# Patient Record
Sex: FEMALE | Race: WHITE | NOT HISPANIC OR LATINO
[De-identification: names, ages, dates, MRNs, and addresses within clinical notes are randomized per-mention and may not be internally consistent; named-entity substitution may affect disease eponyms.]

---

## 2017-03-02 ENCOUNTER — APPOINTMENT (OUTPATIENT)
Dept: OBGYN | Facility: CLINIC | Age: 67
End: 2017-03-02

## 2017-03-02 VITALS
DIASTOLIC BLOOD PRESSURE: 60 MMHG | SYSTOLIC BLOOD PRESSURE: 90 MMHG | BODY MASS INDEX: 29.37 KG/M2 | HEIGHT: 64 IN | WEIGHT: 172 LBS

## 2017-03-02 DIAGNOSIS — Z92.89 PERSONAL HISTORY OF OTHER MEDICAL TREATMENT: ICD-10-CM

## 2017-03-02 DIAGNOSIS — Z86.59 PERSONAL HISTORY OF OTHER MENTAL AND BEHAVIORAL DISORDERS: ICD-10-CM

## 2017-03-02 DIAGNOSIS — Z78.9 OTHER SPECIFIED HEALTH STATUS: ICD-10-CM

## 2017-03-02 DIAGNOSIS — Z86.19 PERSONAL HISTORY OF OTHER INFECTIOUS AND PARASITIC DISEASES: ICD-10-CM

## 2017-03-02 DIAGNOSIS — Z80.42 FAMILY HISTORY OF MALIGNANT NEOPLASM OF PROSTATE: ICD-10-CM

## 2017-03-06 LAB — HPV HIGH+LOW RISK DNA PNL CVX: NEGATIVE

## 2017-03-07 LAB — CYTOLOGY CVX/VAG DOC THIN PREP: NORMAL

## 2017-05-22 ENCOUNTER — OTHER (OUTPATIENT)
Age: 67
End: 2017-05-22

## 2018-03-02 ENCOUNTER — APPOINTMENT (OUTPATIENT)
Dept: OBGYN | Facility: CLINIC | Age: 68
End: 2018-03-02
Payer: MEDICARE

## 2018-03-02 VITALS
BODY MASS INDEX: 34.15 KG/M2 | HEIGHT: 64 IN | SYSTOLIC BLOOD PRESSURE: 125 MMHG | WEIGHT: 200 LBS | DIASTOLIC BLOOD PRESSURE: 70 MMHG

## 2018-03-02 PROCEDURE — 99214 OFFICE O/P EST MOD 30 MIN: CPT

## 2018-03-02 RX ORDER — CLINDAMYCIN HYDROCHLORIDE 150 MG/1
150 CAPSULE ORAL
Qty: 20 | Refills: 0 | Status: COMPLETED | COMMUNITY
Start: 2018-01-12

## 2018-03-02 RX ORDER — MIRTAZAPINE 30 MG/1
30 TABLET, FILM COATED ORAL
Qty: 90 | Refills: 0 | Status: ACTIVE | COMMUNITY
Start: 2018-02-15

## 2018-03-02 RX ORDER — AZITHROMYCIN 250 MG/1
250 TABLET, FILM COATED ORAL
Qty: 6 | Refills: 0 | Status: COMPLETED | COMMUNITY
Start: 2018-01-01

## 2018-03-02 RX ORDER — ENALAPRIL MALEATE 5 MG/1
5 TABLET ORAL
Qty: 90 | Refills: 0 | Status: ACTIVE | COMMUNITY
Start: 2018-02-16

## 2018-03-05 ENCOUNTER — MESSAGE (OUTPATIENT)
Age: 68
End: 2018-03-05

## 2018-04-10 ENCOUNTER — INPATIENT (INPATIENT)
Facility: HOSPITAL | Age: 68
LOS: 2 days | Discharge: HOME | End: 2018-04-13
Attending: FAMILY MEDICINE | Admitting: FAMILY MEDICINE
Payer: COMMERCIAL

## 2018-04-10 VITALS
OXYGEN SATURATION: 92 % | DIASTOLIC BLOOD PRESSURE: 64 MMHG | HEART RATE: 95 BPM | HEIGHT: 62 IN | WEIGHT: 199.96 LBS | RESPIRATION RATE: 14 BRPM | SYSTOLIC BLOOD PRESSURE: 141 MMHG

## 2018-04-10 LAB
ACETONE SERPL-MCNC: (no result)
ADD ON TEST-SPECIMEN IN LAB: SIGNIFICANT CHANGE UP
ALBUMIN SERPL ELPH-MCNC: 3.2 G/DL — LOW (ref 3.3–5)
ALP SERPL-CCNC: 62 U/L — SIGNIFICANT CHANGE UP (ref 40–120)
ALT FLD-CCNC: 68 U/L — SIGNIFICANT CHANGE UP (ref 12–78)
AMPHET UR-MCNC: NEGATIVE — SIGNIFICANT CHANGE UP
ANION GAP SERPL CALC-SCNC: 11 MMOL/L — SIGNIFICANT CHANGE UP (ref 5–17)
ANION GAP SERPL CALC-SCNC: 11 MMOL/L — SIGNIFICANT CHANGE UP (ref 5–17)
ANION GAP SERPL CALC-SCNC: 12 MMOL/L — SIGNIFICANT CHANGE UP (ref 5–17)
ANION GAP SERPL CALC-SCNC: 14 MMOL/L — SIGNIFICANT CHANGE UP (ref 5–17)
ANION GAP SERPL CALC-SCNC: 20 MMOL/L — HIGH (ref 5–17)
APAP SERPL-MCNC: <2 UG/ML — LOW (ref 10–30)
APPEARANCE UR: CLEAR — SIGNIFICANT CHANGE UP
AST SERPL-CCNC: 46 U/L — HIGH (ref 15–37)
BACTERIA # UR AUTO: (no result)
BARBITURATES UR SCN-MCNC: NEGATIVE — SIGNIFICANT CHANGE UP
BASE EXCESS BLDA CALC-SCNC: -2.8 MMOL/L — LOW (ref -2–2)
BASOPHILS # BLD AUTO: 0.07 K/UL — SIGNIFICANT CHANGE UP (ref 0–0.2)
BASOPHILS # BLD AUTO: 0.08 K/UL — SIGNIFICANT CHANGE UP (ref 0–0.2)
BASOPHILS NFR BLD AUTO: 0.3 % — SIGNIFICANT CHANGE UP (ref 0–2)
BASOPHILS NFR BLD AUTO: 0.3 % — SIGNIFICANT CHANGE UP (ref 0–2)
BENZODIAZ UR-MCNC: NEGATIVE — SIGNIFICANT CHANGE UP
BILIRUB SERPL-MCNC: 0.5 MG/DL — SIGNIFICANT CHANGE UP (ref 0.2–1.2)
BILIRUB UR-MCNC: NEGATIVE — SIGNIFICANT CHANGE UP
BLOOD GAS COMMENTS ARTERIAL: SIGNIFICANT CHANGE UP
BUN SERPL-MCNC: 10 MG/DL — SIGNIFICANT CHANGE UP (ref 7–23)
BUN SERPL-MCNC: 12 MG/DL — SIGNIFICANT CHANGE UP (ref 7–23)
BUN SERPL-MCNC: 16 MG/DL — SIGNIFICANT CHANGE UP (ref 7–23)
BUN SERPL-MCNC: 9 MG/DL — SIGNIFICANT CHANGE UP (ref 7–23)
BUN SERPL-MCNC: 9 MG/DL — SIGNIFICANT CHANGE UP (ref 7–23)
CALCIUM SERPL-MCNC: 7.6 MG/DL — LOW (ref 8.5–10.1)
CALCIUM SERPL-MCNC: 7.9 MG/DL — LOW (ref 8.5–10.1)
CALCIUM SERPL-MCNC: 8 MG/DL — LOW (ref 8.5–10.1)
CALCIUM SERPL-MCNC: 8.2 MG/DL — LOW (ref 8.5–10.1)
CALCIUM SERPL-MCNC: 8.6 MG/DL — SIGNIFICANT CHANGE UP (ref 8.5–10.1)
CHLORIDE SERPL-SCNC: 85 MMOL/L — LOW (ref 96–108)
CHLORIDE SERPL-SCNC: 90 MMOL/L — LOW (ref 96–108)
CHLORIDE SERPL-SCNC: 90 MMOL/L — LOW (ref 96–108)
CHLORIDE SERPL-SCNC: 91 MMOL/L — LOW (ref 96–108)
CHLORIDE SERPL-SCNC: 94 MMOL/L — LOW (ref 96–108)
CO2 SERPL-SCNC: 16 MMOL/L — LOW (ref 22–31)
CO2 SERPL-SCNC: 21 MMOL/L — LOW (ref 22–31)
CO2 SERPL-SCNC: 22 MMOL/L — SIGNIFICANT CHANGE UP (ref 22–31)
CO2 SERPL-SCNC: 23 MMOL/L — SIGNIFICANT CHANGE UP (ref 22–31)
CO2 SERPL-SCNC: 23 MMOL/L — SIGNIFICANT CHANGE UP (ref 22–31)
COCAINE METAB.OTHER UR-MCNC: NEGATIVE — SIGNIFICANT CHANGE UP
COLOR SPEC: YELLOW — SIGNIFICANT CHANGE UP
COMMENT - URINE: SIGNIFICANT CHANGE UP
CREAT SERPL-MCNC: 0.66 MG/DL — SIGNIFICANT CHANGE UP (ref 0.5–1.3)
CREAT SERPL-MCNC: 0.75 MG/DL — SIGNIFICANT CHANGE UP (ref 0.5–1.3)
CREAT SERPL-MCNC: 0.76 MG/DL — SIGNIFICANT CHANGE UP (ref 0.5–1.3)
CREAT SERPL-MCNC: 0.81 MG/DL — SIGNIFICANT CHANGE UP (ref 0.5–1.3)
CREAT SERPL-MCNC: 0.98 MG/DL — SIGNIFICANT CHANGE UP (ref 0.5–1.3)
DIFF PNL FLD: (no result)
EOSINOPHIL # BLD AUTO: 0.03 K/UL — SIGNIFICANT CHANGE UP (ref 0–0.5)
EOSINOPHIL # BLD AUTO: 0.04 K/UL — SIGNIFICANT CHANGE UP (ref 0–0.5)
EOSINOPHIL NFR BLD AUTO: 0.1 % — SIGNIFICANT CHANGE UP (ref 0–6)
EOSINOPHIL NFR BLD AUTO: 0.2 % — SIGNIFICANT CHANGE UP (ref 0–6)
EPI CELLS # UR: SIGNIFICANT CHANGE UP
ETHANOL SERPL-MCNC: <10 MG/DL — SIGNIFICANT CHANGE UP (ref 0–10)
GAS PNL BLDA: SIGNIFICANT CHANGE UP
GLUCOSE BLDC GLUCOMTR-MCNC: 183 MG/DL — HIGH (ref 70–99)
GLUCOSE BLDC GLUCOMTR-MCNC: 265 MG/DL — HIGH (ref 70–99)
GLUCOSE SERPL-MCNC: 214 MG/DL — HIGH (ref 70–99)
GLUCOSE SERPL-MCNC: 220 MG/DL — HIGH (ref 70–99)
GLUCOSE SERPL-MCNC: 222 MG/DL — HIGH (ref 70–99)
GLUCOSE SERPL-MCNC: 261 MG/DL — HIGH (ref 70–99)
GLUCOSE SERPL-MCNC: 267 MG/DL — HIGH (ref 70–99)
GLUCOSE UR QL: 250 MG/DL
HCO3 BLDA-SCNC: 20 MMOL/L — LOW (ref 21–29)
HCT VFR BLD CALC: 36.7 % — SIGNIFICANT CHANGE UP (ref 34.5–45)
HCT VFR BLD CALC: 36.9 % — SIGNIFICANT CHANGE UP (ref 34.5–45)
HGB BLD-MCNC: 12.8 G/DL — SIGNIFICANT CHANGE UP (ref 11.5–15.5)
HGB BLD-MCNC: 13.4 G/DL — SIGNIFICANT CHANGE UP (ref 11.5–15.5)
IMM GRANULOCYTES NFR BLD AUTO: 0.9 % — SIGNIFICANT CHANGE UP (ref 0–1.5)
IMM GRANULOCYTES NFR BLD AUTO: 1.5 % — SIGNIFICANT CHANGE UP (ref 0–1.5)
KETONES UR-MCNC: (no result)
LACTATE SERPL-SCNC: 2.3 MMOL/L — HIGH (ref 0.7–2)
LACTATE SERPL-SCNC: 3.1 MMOL/L — HIGH (ref 0.7–2)
LEUKOCYTE ESTERASE UR-ACNC: NEGATIVE — SIGNIFICANT CHANGE UP
LIDOCAIN IGE QN: 93 U/L — SIGNIFICANT CHANGE UP (ref 73–393)
LYMPHOCYTES # BLD AUTO: 14.2 % — SIGNIFICANT CHANGE UP (ref 13–44)
LYMPHOCYTES # BLD AUTO: 18.6 % — SIGNIFICANT CHANGE UP (ref 13–44)
LYMPHOCYTES # BLD AUTO: 3.24 K/UL — SIGNIFICANT CHANGE UP (ref 1–3.3)
LYMPHOCYTES # BLD AUTO: 4.43 K/UL — HIGH (ref 1–3.3)
MAGNESIUM SERPL-MCNC: 1.4 MG/DL — LOW (ref 1.6–2.6)
MAGNESIUM SERPL-MCNC: 1.6 MG/DL — SIGNIFICANT CHANGE UP (ref 1.6–2.6)
MAGNESIUM SERPL-MCNC: 1.9 MG/DL — SIGNIFICANT CHANGE UP (ref 1.6–2.6)
MCHC RBC-ENTMCNC: 30.2 PG — SIGNIFICANT CHANGE UP (ref 27–34)
MCHC RBC-ENTMCNC: 30.2 PG — SIGNIFICANT CHANGE UP (ref 27–34)
MCHC RBC-ENTMCNC: 34.9 GM/DL — SIGNIFICANT CHANGE UP (ref 32–36)
MCHC RBC-ENTMCNC: 36.3 GM/DL — HIGH (ref 32–36)
MCV RBC AUTO: 83.3 FL — SIGNIFICANT CHANGE UP (ref 80–100)
MCV RBC AUTO: 86.6 FL — SIGNIFICANT CHANGE UP (ref 80–100)
METHADONE UR-MCNC: NEGATIVE — SIGNIFICANT CHANGE UP
MONOCYTES # BLD AUTO: 1.13 K/UL — HIGH (ref 0–0.9)
MONOCYTES # BLD AUTO: 1.21 K/UL — HIGH (ref 0–0.9)
MONOCYTES NFR BLD AUTO: 4.9 % — SIGNIFICANT CHANGE UP (ref 2–14)
MONOCYTES NFR BLD AUTO: 5.1 % — SIGNIFICANT CHANGE UP (ref 2–14)
NEUTROPHILS # BLD AUTO: 17.7 K/UL — HIGH (ref 1.8–7.4)
NEUTROPHILS # BLD AUTO: 18.17 K/UL — HIGH (ref 1.8–7.4)
NEUTROPHILS NFR BLD AUTO: 74.3 % — SIGNIFICANT CHANGE UP (ref 43–77)
NEUTROPHILS NFR BLD AUTO: 79.6 % — HIGH (ref 43–77)
NITRITE UR-MCNC: NEGATIVE — SIGNIFICANT CHANGE UP
NRBC # BLD: 0 /100 WBCS — SIGNIFICANT CHANGE UP (ref 0–0)
NRBC # BLD: 0 /100 WBCS — SIGNIFICANT CHANGE UP (ref 0–0)
OPIATES UR-MCNC: NEGATIVE — SIGNIFICANT CHANGE UP
PCO2 BLDA: 31 MMHG — LOW (ref 32–46)
PCP SPEC-MCNC: SIGNIFICANT CHANGE UP
PCP UR-MCNC: NEGATIVE — SIGNIFICANT CHANGE UP
PH BLDA: 7.43 — SIGNIFICANT CHANGE UP (ref 7.35–7.45)
PH UR: 5 — SIGNIFICANT CHANGE UP (ref 5–8)
PHOSPHATE SERPL-MCNC: 2.4 MG/DL — LOW (ref 2.5–4.5)
PHOSPHATE SERPL-MCNC: 2.9 MG/DL — SIGNIFICANT CHANGE UP (ref 2.5–4.5)
PLATELET # BLD AUTO: 191 K/UL — SIGNIFICANT CHANGE UP (ref 150–400)
PLATELET # BLD AUTO: 247 K/UL — SIGNIFICANT CHANGE UP (ref 150–400)
PO2 BLDA: 82 MMHG — SIGNIFICANT CHANGE UP (ref 74–108)
POTASSIUM SERPL-MCNC: 3.2 MMOL/L — LOW (ref 3.5–5.3)
POTASSIUM SERPL-MCNC: 3.3 MMOL/L — LOW (ref 3.5–5.3)
POTASSIUM SERPL-MCNC: 3.3 MMOL/L — LOW (ref 3.5–5.3)
POTASSIUM SERPL-MCNC: 3.5 MMOL/L — SIGNIFICANT CHANGE UP (ref 3.5–5.3)
POTASSIUM SERPL-MCNC: 3.6 MMOL/L — SIGNIFICANT CHANGE UP (ref 3.5–5.3)
POTASSIUM SERPL-SCNC: 3.2 MMOL/L — LOW (ref 3.5–5.3)
POTASSIUM SERPL-SCNC: 3.3 MMOL/L — LOW (ref 3.5–5.3)
POTASSIUM SERPL-SCNC: 3.3 MMOL/L — LOW (ref 3.5–5.3)
POTASSIUM SERPL-SCNC: 3.5 MMOL/L — SIGNIFICANT CHANGE UP (ref 3.5–5.3)
POTASSIUM SERPL-SCNC: 3.6 MMOL/L — SIGNIFICANT CHANGE UP (ref 3.5–5.3)
PROT SERPL-MCNC: 6.8 GM/DL — SIGNIFICANT CHANGE UP (ref 6–8.3)
PROT UR-MCNC: 30 MG/DL
RBC # BLD: 4.24 M/UL — SIGNIFICANT CHANGE UP (ref 3.8–5.2)
RBC # BLD: 4.43 M/UL — SIGNIFICANT CHANGE UP (ref 3.8–5.2)
RBC # FLD: 12.5 % — SIGNIFICANT CHANGE UP (ref 10.3–14.5)
RBC # FLD: 12.6 % — SIGNIFICANT CHANGE UP (ref 10.3–14.5)
RBC CASTS # UR COMP ASSIST: SIGNIFICANT CHANGE UP /HPF (ref 0–4)
SALICYLATES SERPL-MCNC: <1.7 MG/DL — LOW (ref 2.8–20)
SAO2 % BLDA: 96 % — SIGNIFICANT CHANGE UP (ref 92–96)
SODIUM SERPL-SCNC: 121 MMOL/L — LOW (ref 135–145)
SODIUM SERPL-SCNC: 124 MMOL/L — LOW (ref 135–145)
SODIUM SERPL-SCNC: 125 MMOL/L — LOW (ref 135–145)
SODIUM SERPL-SCNC: 125 MMOL/L — LOW (ref 135–145)
SODIUM SERPL-SCNC: 128 MMOL/L — LOW (ref 135–145)
SP GR SPEC: 1.01 — SIGNIFICANT CHANGE UP (ref 1.01–1.02)
THC UR QL: NEGATIVE — SIGNIFICANT CHANGE UP
TROPONIN I SERPL-MCNC: 0.15 NG/ML — HIGH (ref 0.01–0.04)
TSH SERPL-MCNC: 0.52 UU/ML — SIGNIFICANT CHANGE UP (ref 0.36–3.74)
UROBILINOGEN FLD QL: NEGATIVE MG/DL — SIGNIFICANT CHANGE UP
WBC # BLD: 22.84 K/UL — HIGH (ref 3.8–10.5)
WBC # BLD: 23.82 K/UL — HIGH (ref 3.8–10.5)
WBC # FLD AUTO: 22.84 K/UL — HIGH (ref 3.8–10.5)
WBC # FLD AUTO: 23.82 K/UL — HIGH (ref 3.8–10.5)
WBC UR QL: SIGNIFICANT CHANGE UP

## 2018-04-10 PROCEDURE — 74177 CT ABD & PELVIS W/CONTRAST: CPT | Mod: 26

## 2018-04-10 PROCEDURE — 71260 CT THORAX DX C+: CPT | Mod: 26

## 2018-04-10 PROCEDURE — 93010 ELECTROCARDIOGRAM REPORT: CPT

## 2018-04-10 PROCEDURE — 93306 TTE W/DOPPLER COMPLETE: CPT | Mod: 26

## 2018-04-10 PROCEDURE — 71045 X-RAY EXAM CHEST 1 VIEW: CPT | Mod: 26

## 2018-04-10 PROCEDURE — 99285 EMERGENCY DEPT VISIT HI MDM: CPT | Mod: 25

## 2018-04-10 PROCEDURE — 76700 US EXAM ABDOM COMPLETE: CPT | Mod: 26

## 2018-04-10 PROCEDURE — 70450 CT HEAD/BRAIN W/O DYE: CPT | Mod: 26

## 2018-04-10 PROCEDURE — 72125 CT NECK SPINE W/O DYE: CPT | Mod: 26

## 2018-04-10 RX ORDER — VANCOMYCIN HCL 1 G
1000 VIAL (EA) INTRAVENOUS ONCE
Qty: 0 | Refills: 0 | Status: COMPLETED | OUTPATIENT
Start: 2018-04-10 | End: 2018-04-10

## 2018-04-10 RX ORDER — SODIUM CHLORIDE 9 MG/ML
1000 INJECTION INTRAMUSCULAR; INTRAVENOUS; SUBCUTANEOUS
Qty: 0 | Refills: 0 | Status: DISCONTINUED | OUTPATIENT
Start: 2018-04-10 | End: 2018-04-10

## 2018-04-10 RX ORDER — INSULIN LISPRO 100/ML
VIAL (ML) SUBCUTANEOUS EVERY 6 HOURS
Qty: 0 | Refills: 0 | Status: DISCONTINUED | OUTPATIENT
Start: 2018-04-10 | End: 2018-04-13

## 2018-04-10 RX ORDER — DEXTROSE 50 % IN WATER 50 %
25 SYRINGE (ML) INTRAVENOUS ONCE
Qty: 0 | Refills: 0 | Status: DISCONTINUED | OUTPATIENT
Start: 2018-04-10 | End: 2018-04-13

## 2018-04-10 RX ORDER — PIPERACILLIN AND TAZOBACTAM 4; .5 G/20ML; G/20ML
3.38 INJECTION, POWDER, LYOPHILIZED, FOR SOLUTION INTRAVENOUS EVERY 8 HOURS
Qty: 0 | Refills: 0 | Status: DISCONTINUED | OUTPATIENT
Start: 2018-04-10 | End: 2018-04-12

## 2018-04-10 RX ORDER — GLUCAGON INJECTION, SOLUTION 0.5 MG/.1ML
1 INJECTION, SOLUTION SUBCUTANEOUS ONCE
Qty: 0 | Refills: 0 | Status: DISCONTINUED | OUTPATIENT
Start: 2018-04-10 | End: 2018-04-13

## 2018-04-10 RX ORDER — DEXTROSE MONOHYDRATE, SODIUM CHLORIDE, AND POTASSIUM CHLORIDE 50; .745; 4.5 G/1000ML; G/1000ML; G/1000ML
1000 INJECTION, SOLUTION INTRAVENOUS
Qty: 0 | Refills: 0 | Status: DISCONTINUED | OUTPATIENT
Start: 2018-04-10 | End: 2018-04-11

## 2018-04-10 RX ORDER — SODIUM CHLORIDE 9 MG/ML
1000 INJECTION INTRAMUSCULAR; INTRAVENOUS; SUBCUTANEOUS ONCE
Qty: 0 | Refills: 0 | Status: COMPLETED | OUTPATIENT
Start: 2018-04-10 | End: 2018-04-10

## 2018-04-10 RX ORDER — DEXTROSE 50 % IN WATER 50 %
1 SYRINGE (ML) INTRAVENOUS ONCE
Qty: 0 | Refills: 0 | Status: DISCONTINUED | OUTPATIENT
Start: 2018-04-10 | End: 2018-04-13

## 2018-04-10 RX ORDER — SODIUM CHLORIDE 9 MG/ML
1000 INJECTION, SOLUTION INTRAVENOUS
Qty: 0 | Refills: 0 | Status: DISCONTINUED | OUTPATIENT
Start: 2018-04-10 | End: 2018-04-13

## 2018-04-10 RX ORDER — MAGNESIUM SULFATE 500 MG/ML
1 VIAL (ML) INJECTION ONCE
Qty: 0 | Refills: 0 | Status: COMPLETED | OUTPATIENT
Start: 2018-04-10 | End: 2018-04-10

## 2018-04-10 RX ORDER — POTASSIUM PHOSPHATE, MONOBASIC POTASSIUM PHOSPHATE, DIBASIC 236; 224 MG/ML; MG/ML
15 INJECTION, SOLUTION INTRAVENOUS ONCE
Qty: 0 | Refills: 0 | Status: COMPLETED | OUTPATIENT
Start: 2018-04-10 | End: 2018-04-10

## 2018-04-10 RX ORDER — DEXTROSE 50 % IN WATER 50 %
12.5 SYRINGE (ML) INTRAVENOUS ONCE
Qty: 0 | Refills: 0 | Status: DISCONTINUED | OUTPATIENT
Start: 2018-04-10 | End: 2018-04-13

## 2018-04-10 RX ORDER — ENOXAPARIN SODIUM 100 MG/ML
40 INJECTION SUBCUTANEOUS EVERY 24 HOURS
Qty: 0 | Refills: 0 | Status: DISCONTINUED | OUTPATIENT
Start: 2018-04-10 | End: 2018-04-13

## 2018-04-10 RX ADMIN — ENOXAPARIN SODIUM 40 MILLIGRAM(S): 100 INJECTION SUBCUTANEOUS at 21:43

## 2018-04-10 RX ADMIN — PIPERACILLIN AND TAZOBACTAM 25 GRAM(S): 4; .5 INJECTION, POWDER, LYOPHILIZED, FOR SOLUTION INTRAVENOUS at 21:43

## 2018-04-10 RX ADMIN — Medication 250 MILLIGRAM(S): at 10:51

## 2018-04-10 RX ADMIN — SODIUM CHLORIDE 1000 MILLILITER(S): 9 INJECTION INTRAMUSCULAR; INTRAVENOUS; SUBCUTANEOUS at 06:03

## 2018-04-10 RX ADMIN — Medication 1: at 21:43

## 2018-04-10 RX ADMIN — POTASSIUM PHOSPHATE, MONOBASIC POTASSIUM PHOSPHATE, DIBASIC 62.5 MILLIMOLE(S): 236; 224 INJECTION, SOLUTION INTRAVENOUS at 14:28

## 2018-04-10 RX ADMIN — Medication 3: at 14:31

## 2018-04-10 RX ADMIN — SODIUM CHLORIDE 125 MILLILITER(S): 9 INJECTION INTRAMUSCULAR; INTRAVENOUS; SUBCUTANEOUS at 07:02

## 2018-04-10 RX ADMIN — Medication 100 GRAM(S): at 17:23

## 2018-04-10 RX ADMIN — PIPERACILLIN AND TAZOBACTAM 25 GRAM(S): 4; .5 INJECTION, POWDER, LYOPHILIZED, FOR SOLUTION INTRAVENOUS at 10:58

## 2018-04-10 RX ADMIN — DEXTROSE MONOHYDRATE, SODIUM CHLORIDE, AND POTASSIUM CHLORIDE 125 MILLILITER(S): 50; .745; 4.5 INJECTION, SOLUTION INTRAVENOUS at 21:42

## 2018-04-10 NOTE — SWALLOW BEDSIDE ASSESSMENT ADULT - ASR SWALLOW LABIAL MOBILITY
Effort was reduced when executing volitional labial actions but lip strength/agility were WFL on exam without evidence of an overt labial focality.

## 2018-04-10 NOTE — SWALLOW BEDSIDE ASSESSMENT ADULT - SWALLOW EVAL: CRITERIA FOR SKILLED INTERVENTION MET
No overt primary speech-language pathology or dave Dysphagia process were evident when pt was in an alert state. Do not feel that acute speech path intervention is clinically warranted nor do I feel it would . Thus, WILL NOT ACTIVELY FOLLOW. RECONSULT PRN SHOULD STATUS CHANGE AND CONDITION WARRANT.

## 2018-04-10 NOTE — CONSULT NOTE ADULT - SUBJECTIVE AND OBJECTIVE BOX
Patient is a 67y old  Female who presents with a chief complaint of AMS (10 Apr 2018 09:08)      HPI:  68 y/o female in ED found at bottom of stairs by family with AMS.  pt confused with no complaints.   visible ecchymosis to face and abd.patient was cleared by trauma team in ED - Had CT head/C spine /abd/pelvis/chest -no acute trauma .As per juan luis bowman to whom hospitalist spoke over the phone .patient at baseline is very coherent. At this time patient is oriented to self alone and does not follow all single step commands. patient is awake .  denies any pain  Patient was found to have low Na, low bicarb  with elevated anion gap and small ketones in urine , elevated WBC  no fever  patient was given IVF bolus x1 and now on IVF NS 125cc./hr started in ED    History per chart and hospitalist, patient is a poor historian.  Patient denies any abdominal pain. Elevated white count concerning for sepsis.  Has a history of cholecystectomy and has dilated common bile duct.    pmhx- DM on metformin ,HLD  per juan luis does not know more hx  pshx- hysterectomy and choecystectomy per juan luis  family hx -non contributory  social hx- patient lives alone, no smoking or alcohol use per juan luis (10 Apr 2018 09:08)      PAST MEDICAL & SURGICAL HISTORY:      MEDICATIONS  (STANDING):  dextrose 5%. 1000 milliLiter(s) (50 mL/Hr) IV Continuous <Continuous>  dextrose 50% Injectable 12.5 Gram(s) IV Push once  dextrose 50% Injectable 25 Gram(s) IV Push once  dextrose 50% Injectable 25 Gram(s) IV Push once  enoxaparin Injectable 40 milliGRAM(s) SubCutaneous every 24 hours  insulin lispro (HumaLOG) corrective regimen sliding scale   SubCutaneous every 6 hours  piperacillin/tazobactam IVPB. 3.375 Gram(s) IV Intermittent every 8 hours  sodium chloride 0.9% with potassium chloride 20 mEq/L 1000 milliLiter(s) (125 mL/Hr) IV Continuous <Continuous>    MEDICATIONS  (PRN):  dextrose Gel 1 Dose(s) Oral once PRN Blood Glucose LESS THAN 70 milliGRAM(s)/deciliter  glucagon  Injectable 1 milliGRAM(s) IntraMuscular once PRN Glucose LESS THAN 70 milligrams/deciliter      Allergies    Allergy Status Unknown    Intolerances        SOCIAL HISTORY:NC, neg drugs    FAMILY HISTORY:  No pertinent family history in first degree relatives      REVIEW OF SYSTEMS:    not reliable    Vital Signs Last 24 Hrs  T(C): 37.1 (10 Apr 2018 16:22), Max: 37.1 (10 Apr 2018 16:22)  T(F): 98.7 (10 Apr 2018 16:22), Max: 98.7 (10 Apr 2018 16:22)  HR: 82 (10 Apr 2018 16:22) (82 - 97)  BP: 127/61 (10 Apr 2018 16:22) (115/62 - 141/64)  BP(mean): --  RR: 18 (10 Apr 2018 16:22) (14 - 18)  SpO2: 96% (10 Apr 2018 16:22) (92% - 100%)    PHYSICAL EXAM:    Constitutional: NAD, well-developed  HEENT: EOMI, throat clear  Neck: No LAD, supple  Respiratory: CTA and P  Cardiovascular: S1 and S2, RRR, no M  Gastrointestinal: BS+, soft, NT/ND, neg HSM,  Extremities: No peripheral edema, neg clubing, cyanosis  Vascular: 2+ peripheral pulses  Neurological: A/O x 3, no focal deficits  Psychiatric: Normal mood, normal affect  Skin: No rashes    LABS:  CBC Full  -  ( 10 Apr 2018 09:14 )  WBC Count : 22.84 K/uL  Hemoglobin : 13.4 g/dL  Hematocrit : 36.9 %  Platelet Count - Automated : 191 K/uL  Mean Cell Volume : 83.3 fl  Mean Cell Hemoglobin : 30.2 pg  Mean Cell Hemoglobin Concentration : 36.3 gm/dL  Auto Neutrophil # : 18.17 K/uL  Auto Lymphocyte # : 3.24 K/uL  Auto Monocyte # : 1.13 K/uL  Auto Eosinophil # : 0.03 K/uL  Auto Basophil # : 0.07 K/uL  Auto Neutrophil % : 79.6 %  Auto Lymphocyte % : 14.2 %  Auto Monocyte % : 4.9 %  Auto Eosinophil % : 0.1 %  Auto Basophil % : 0.3 %    04-10    125<L>  |  91<L>  |  9   ----------------------------<  222<H>  3.6   |  23  |  0.75    Ca    8.0<L>      10 Apr 2018 16:35  Phos  2.4     04-10  Mg     1.6     04-10    TPro  6.8  /  Alb  3.2<L>  /  TBili  0.5  /  DBili  x   /  AST  46<H>  /  ALT  68  /  AlkPhos  62  04-10            RADIOLOGY & ADDITIONAL STUDIES:  < from: US Abdomen Complete (04.10.18 @ 11:44) >  EXAM:  US COMPLETE ABDOMEN SONOGRAM                            PROCEDURE DATE:  04/10/2018          INTERPRETATION:  US COMPLETE ABDOMEN SONOGRAM    HISTORY:  Bile duct dilatation on CT    COMPARISON: Same day CT abdomen and pelvis    Multiple transverse and longitudinal sonographic images of the abdomen   were obtained.    LIVER: 19 cm in length. The liver is diffusely increased in echogenicity,   consistent with fatty infiltration. No focal lesions are identified.   Ultrasound sensitivity is somewhat limited in this setting. Main portal   vein is patent with normal flow direction.    SPLEEN:  10.8 cm in length.     GALLBLADDER: Status post cholecystectomy.        BILE DUCTS: The common bile duct measures 12 mm.  No intrahepatic ductal   dilatation.    PANCREAS:  The head and proximal body are normal. The distal body and   tail are obscured.    RIGHT KIDNEY: 12 cm in length.  No hydronephrosis or shadowing stone.    LEFT KIDNEY:  12.5 cm in length.  No hydronephrosis or shadowing stone.    AORTA AND INFERIOR VENA CAVA: Normal in caliber.        FLUID: No ascites.    IMPRESSION:    Hepatomegaly and diffuse hepatic steatosis.    12 mm common bile duct likely related to postcholecystectomy state.              < end of copied text >  < from: CT Abdomen and Pelvis w/ IV Cont (04.10.18 @ 05:35) >  EXAM:  CT ABDOMEN AND PELVIS IC                          EXAM:  CT CHEST IC                            PROCEDURE DATE:  04/10/2018          INTERPRETATION:  CT CHEST, ABDOMEN, AND PELVIS DATED 4/10/2018.    CLINICAL INFORMATION:  Status post fall with change in mental status,   found on floor..    TECHNIQUE:  Axial CT images through the chest and abdomen are obtained   during arterial phase.  Thereafter, delayed venous phase images through   the abdomen and pelvis are acquired. Images are reformatted in the   sagittal and coronal planes. Maximum intensity projection images are   obtained. 95cc of Omnipaque 350 were administered without event.  5cc   were discarded.    No prior studies are available for comparison.    FINDINGS:    The thoracic and abdominal aorta are normal in caliber and contour.    There is no mediastinal or periaortic hematoma.  There is no evidence of   acute traumatic aortic injury.      The heart is borderline enlarged. There is no pericardial effusion.    There isno focal lung consolidation, suspicious nodule, or mass. There   is bibasilar dependent atelectasis. The central airways are patent. There   is no pleural effusion, hemothorax, or pneumothorax.     There is no significant supraclavicular, axillary, mediastinal, or hilar   lymphadenopathy.    The visualized thyroid gland is unremarkable in appearance.    There is diffuse hepatic steatosis. Cholecystectomy clips are present.   Common bile duct is minimally dilated measuring up to 7-8 mm with distal  tapering. The pancreas, spleen, adrenal glands, and kidneys are   unremarkable. The urinary bladder is unremarkable in appearance. The   uterus is absent. The adnexa are unremarkable.    There is a small hiatus hernia. There is no bowel obstructionor overt   bowel wall thickening. A normal appendix is identified. There are a few   colonic diverticula without surrounding inflammatory change. There is no   pneumoperitoneum, hemoperitoneum, or ascites. There is no evidence of a   mesenteric hematoma.    There is no significant abdominal or pelvic lymphadenopathy.    There is a tiny fat-containing umbilical hernia.    Evaluation for subtle rib fractures is limited given motion degradation.   There is no gross acute osseous fracture. There are degenerative changes   of the spine.      IMPRESSION:    No CT evidence of acute traumatic injury to the chest, abdomen, or pelvis.            < end of copied text >
66 y/o female in ED found at bottom of stairs by family with AMS.  pt confused with no complaints.   visible ecchymosis to face and abd.patient was cleared by trauma team in ED - Had CT head/C spine /abd/pelvis/chest -no acute trauma .As per juan luis bowman to whom i spoke over the phone .patient at baseline is very coherent. At this time patient is oriented to self alone and does not follow all single step commands. patient is awake .  denies any pain  Patient was found to have low Na, low bicarb  with elevated anion gap and small ketones in urine , elevated WBC  no fever  patient was given IVF bolus x1 and now on IVF NS 125cc./hr started in ED    4/10-was [facility] over the weekend and started on antibiotics for a respiratory infection.   Has noticed blood sugars have been high and low.   remembers blood sugar high yesterday and took medication. remembers falling and was found this morning by the land lord.   slow thinking. Niece says this isn't her baseline.   denies chest pain or unusual shortness of breath. has asthma related sob.     pmhx- DM on metformin ,HLD  per niece does not know more hx  pshx- hysterectomy and cholecystectomy per juan luis  family hx -non contributory  social hx- patient lives alone, no smoking or alcohol use per juan luis (10 Apr 2018 09:08)      PAST MEDICAL & SURGICAL HISTORY:    MEDICATIONS  (STANDING):  dextrose 5%. 1000 milliLiter(s) (50 mL/Hr) IV Continuous <Continuous>  dextrose 50% Injectable 12.5 Gram(s) IV Push once  dextrose 50% Injectable 25 Gram(s) IV Push once  dextrose 50% Injectable 25 Gram(s) IV Push once  enoxaparin Injectable 40 milliGRAM(s) SubCutaneous every 24 hours  insulin lispro (HumaLOG) corrective regimen sliding scale   SubCutaneous every 6 hours  magnesium sulfate  IVPB 1 Gram(s) IV Intermittent once  piperacillin/tazobactam IVPB. 3.375 Gram(s) IV Intermittent every 8 hours  potassium phosphate IVPB 15 milliMole(s) IV Intermittent once  sodium chloride 0.9% with potassium chloride 20 mEq/L 1000 milliLiter(s) (125 mL/Hr) IV Continuous <Continuous>    MEDICATIONS  (PRN):  dextrose Gel 1 Dose(s) Oral once PRN Blood Glucose LESS THAN 70 milliGRAM(s)/deciliter  glucagon  Injectable 1 milliGRAM(s) IntraMuscular once PRN Glucose LESS THAN 70 milligrams/deciliter    Allergies    Allergy Status Unknown    Intolerances      FAMILY HISTORY:  No pertinent family history in first degree relatives        REVIEW OF SYSTEMS:    CONSTITUTIONAL: No weakness, fevers or chills  EYES/ENT: No visual changes;  No vertigo or throat pain   NECK: No pain or stiffness  RESPIRATORY: No cough, wheezing, hemoptysis; No shortness of breath  CARDIOVASCULAR: No chest pain or palpitations  GASTROINTESTINAL: No abdominal or epigastric pain. No nausea, vomiting, or hematemesis; No diarrhea or constipation. No melena or hematochezia.  GENITOURINARY: No dysuria, frequency or hematuria  NEUROLOGICAL: No numbness or weakness  SKIN: No itching, burning, rashes, or lesions   All other review of systems is negative unless indicated above      PHYSICAL EXAM:  Daily Height in cm: 157.48 (10 Apr 2018 04:50)    Daily   Vital Signs Last 24 Hrs  T(C): 36.8 (10 Apr 2018 12:07), Max: 36.8 (10 Apr 2018 12:07)  T(F): 98.3 (10 Apr 2018 12:07), Max: 98.3 (10 Apr 2018 12:07)  HR: 97 (10 Apr 2018 12:07) (88 - 97)  BP: 131/89 (10 Apr 2018 12:07) (115/62 - 141/64)  BP(mean): --  RR: 16 (10 Apr 2018 12:07) (14 - 18)  SpO2: 100% (10 Apr 2018 12:07) (92% - 100%)    Constitutional: NAD, awake and alert, well-developed  HEENT: PERR, EOMI, Normal Hearing, MMM  Neck: Soft and supple, No LAD, No JVD  Respiratory: rales and rhonchi lower left lung.  Cardiovascular: S1 and S2, regular rate and rhythm, no Murmurs, gallops or rubs  Gastrointestinal: Bowel Sounds present, soft, nontender, nondistended, no guarding, no rebound  Extremities: No peripheral edema  Vascular: 2+ peripheral pulses  Neurological: A/O x 3, no focal deficits  Musculoskeletal: 5/5 strength b/l upper and lower extremities  Skin: No rashes    LABS: All Labs Reviewed:                        13.4   22.84 )-----------( 191      ( 10 Apr 2018 09:14 )             36.9     04-10    124<L>  |  90<L>  |  12  ----------------------------<  220<H>  3.3<L>   |  22  |  0.81    Ca    7.9<L>      10 Apr 2018 09:14  Phos  2.4     04-10  Mg     1.4     04-10    TPro  6.8  /  Alb  3.2<L>  /  TBili  0.5  /  DBili  x   /  AST  46<H>  /  ALT  68  /  AlkPhos  62  04-10      CARDIAC MARKERS ( 10 Apr 2018 12:44 )  0.147 ng/mL / x     / x     / x     / x          Blood Culture:     RADIOLOGY:   < from: Xray Chest 1 View AP/PA. (04.10.18 @ 07:12) >  EXAM:  XR CHEST AP OR PA 1V                            PROCEDURE DATE:  04/10/2018          INTERPRETATION:  AP chest on April 10, 2018 at 5:26 AM. Patient had a   fall.    We have no prior chest films.    Heart is normal for projection.    The lung fields and pleural surfaces are unremarkable.    IMPRESSION: No infiltrate.    < end of copied text >  < from: CT Abdomen and Pelvis w/ IV Cont (04.10.18 @ 05:35) >  EXAM:  CT ABDOMEN AND PELVIS IC                          EXAM:  CT CHEST IC                            PROCEDURE DATE:  04/10/2018          INTERPRETATION:  CT CHEST, ABDOMEN, AND PELVIS DATED 4/10/2018.    CLINICAL INFORMATION:  Status post fall with change in mental status,   found on floor..    TECHNIQUE:  Axial CT images through the chest and abdomen are obtained   during arterial phase.  Thereafter, delayed venous phase images through   the abdomen and pelvis are acquired. Images are reformatted in the   sagittal and coronal planes. Maximum intensity projection images are   obtained. 95cc of Omnipaque 350 were administered without event.  5cc   were discarded.    No prior studies are available for comparison.    FINDINGS:    The thoracic and abdominal aorta are normal in caliber and contour.    There is no mediastinal or periaortic hematoma.  There is no evidence of   acute traumatic aortic injury.      The heart is borderline enlarged. There is no pericardial effusion.    There is no focal lung consolidation, suspicious nodule, or mass. There   is bibasilar dependent atelectasis. The central airways are patent. There   is no pleural effusion, hemothorax, or pneumothorax.     There is no significant supraclavicular, axillary, mediastinal, or hilar   lymphadenopathy.    The visualized thyroid gland is unremarkable in appearance.    There is diffuse hepatic steatosis. Cholecystectomy clips are present.   Common bile duct is minimally dilated measuring up to 7-8 mm with distal  tapering. The pancreas, spleen, adrenal glands, and kidneys are   unremarkable. The urinary bladder is unremarkable in appearance. The   uterus is absent. The adnexa are unremarkable.    There is a small hiatus hernia. There is no bowel obstructionor overt   bowel wall thickening. A normal appendix is identified. There are a few   colonic diverticula without surrounding inflammatory change. There is no   pneumoperitoneum, hemoperitoneum, or ascites. There is no evidence of a   mesenteric hematoma.    There is no significant abdominal or pelvic lymphadenopathy.    There is a tiny fat-containing umbilical hernia.    Evaluation for subtle rib fractures is limited given motion degradation.   There is no gross acute osseous fracture. There are degenerative changes   of the spine.      IMPRESSION:    No CT evidence of acute traumatic injury to the chest, abdomen, or pelvis.    < end of copied text >    < from: CT Head No Cont (04.10.18 @ 05:31) >  EXAM:  CT BRAIN                            PROCEDURE DATE:  04/10/2018          INTERPRETATION:  NONCONTRAST CT OF THE BRAIN DATED 4/10/2018.    CLINICAL HISTORY: Status post fall with change in mental status.    TECHNIQUE: Axial CT images are obtained from the cranial vertex to the   skull base without the administration of IV contrast. Images are   reformatted in sagittal and coronal planes.    No prior studies are available for comparison.    FINDINGS:    There is no acute intra-axial or extra-axial hemorrhage. There is no mass   effect or shift of the midline. The basal cisterns are not effaced. The   ventricles are not dilated. Gray-white-white matter differentiation is   grossly preserved. There is no CT evidence of an acute vascular   territorial infarct.    There is no significant scalp soft tissue swelling or scalp hematoma. The   skull base and bony calvarium are intact. The tympanic and mastoid   cavities are well aerated. There is mild bilateral ethmoid and right   sphenoid sinus mucosal thickening and moderate bilateral maxillary sinus   mucosal thickening with aerosolized secretions in the right maxilla.    IMPRESSION:    No acute intracranial hemorrhage, mass effect, or acute osseous fracture.    Paranasal sinus disease, as described.    < end of copied text >      EKG: NSR, No ischemic changes    CARDIOLOGY TESTING:< from: Transthoracic Echocardiogram (04.10.18 @ 11:15) >   EXAM:  2D ECHOCARDIOGRAM AD         PROCEDURE DATE:  04/10/2018        INTERPRETATION:  Transthoracic Echocardiography Report (TTE)     Demographics     Patient name       ARVIND TOLEDO      Age           67 year(s)     Med Rec #          [id number]          Gender        Female     Account #          [id number]            Date of Birth 1950     Interpreting       Toño Larson DO     Room Number   0004   Physician     Referring          Agustin Garcia Sonographer   Physician SANDI Loya                             [facility]     Date of study      04/10/2018 11:04                      AM     Height             62.01 in           Weight        199.96 pounds    Type of Study:     TTE procedure: 2D echocardiogram     Study Location: EDTechnical Quality: Technically Difficullt    Indications   1) R55 - Syncope and collapse    M-Mode Measurements (cm)     LVEDd: 5.01 cm              LVESd: 3.5 cm   IVSEd: 1.1 cm   LVPWd: 1.11 cm              AO Root Dimension: 3 cm                             ACS: 1.8 cm                               LA: 3.7 cm    Doppler Measurements:     AV Velocity:144 cm/s   AV Peak Gradient: 8.29 mmHg     TR Velocity:191 cm/s   TR Gradient:14.5924 mmHg   Estimated RAP:5 mmHg   RVSP:20 mmHg     Findings     Mitral Valve   Normal appearing mitral valve structure and function.   Trace to mild mitral regurgitation is present.     Aortic Valve   Normal aortic valve structure and function.   No aortic regurgitation is present.     Tricuspid Valve   Normal appearing tricuspid valve structure and function.   Trace tricuspid valve regurgitation is present.     Pulmonic Valve   Pulmonic valve not well seen, probably normal pulmonic valve function.     Left Atrium   The left atrium is normal.     Left Ventricle   The left ventricle is normal in size, wall thickness, wall motion and   contractility.   Estimated left ventricular ejection fraction is 55-60%.     Right Atrium   Normal appearing right atrium.     Right Ventricle   Normal appearing right ventricle structure and function.     Pericardial Effusion   No evidence of pericardial effusion.     Pleural Effusion   No evidence of pleural effusion.     Impression     Summary     The left ventricle is normal in size, wall thickness, wall motion and   contractility.   Estimated left ventricular ejection fraction is 55-60%.   Normal appearing right ventricle structure and function.     Trace to mild mitral regurgitation is present.   Trace tricuspid valve regurgitation is present.     No evidence of pericardial effusion.     Signature     ----------------------------------------------------------------   Electronically signed by Toño Larson DO(Interpreting   physician) on 04/10/2018 02:22 PM    < end of copied text >
Patient is a 67y Female whom presented to the hospital with history diabetes, hyperlipidemia, asthma, s/p cholecystectomy and hysterectomy admitted on 4/10 for change in mental status. Patient was found as per niece  at bottom of stairs. She was [facility] and did get abx for a bronchitis. Reports decareased intake, nausea and also reports diarrhea last night. Denies any other new meds or other new issues. More alert now then early as per RN staff and niece.       PAST MEDICAL & SURGICAL HISTORY:  DM, HTN      MEDICATIONS  (STANDING):  dextrose 5%. 1000 milliLiter(s) (50 mL/Hr) IV Continuous <Continuous>  dextrose 50% Injectable 12.5 Gram(s) IV Push once  dextrose 50% Injectable 25 Gram(s) IV Push once  dextrose 50% Injectable 25 Gram(s) IV Push once  enoxaparin Injectable 40 milliGRAM(s) SubCutaneous every 24 hours  insulin lispro (HumaLOG) corrective regimen sliding scale   SubCutaneous every 6 hours  magnesium sulfate  IVPB 1 Gram(s) IV Intermittent once  piperacillin/tazobactam IVPB. 3.375 Gram(s) IV Intermittent every 8 hours  sodium chloride 0.9% with potassium chloride 20 mEq/L 1000 milliLiter(s) (125 mL/Hr) IV Continuous <Continuous>    MEDICATIONS  (PRN):  dextrose Gel 1 Dose(s) Oral once PRN Blood Glucose LESS THAN 70 milliGRAM(s)/deciliter  glucagon  Injectable 1 milliGRAM(s) IntraMuscular once PRN Glucose LESS THAN 70 milligrams/deciliter      Allergies    Allergy Status Unknown    Intolerances        SOCIAL HISTORY:  no current etoh    FAMILY HISTORY:  No pertinent family history in first degree relatives      REVIEW OF SYSTEMS:    CONSTITUTIONAL: + weakness  EYES/ENT: No visual changes;  No vertigo or throat pain   NECK: No pain or stiffness  RESPIRATORY: No cough, wheezing, hemoptysis; No shortness of breath  CARDIOVASCULAR: No chest pain or palpitations  GASTROINTESTINAL: + nausea, vomiting  GENITOURINARY: No dysuria, frequency or hematuria  NEUROLOGICAL: No numbness or weakness  SKIN: No itching, burning, rashes, or lesions   All other review of systems is negative unless indicated above.      T(C): , Max: 37.1 (04-10-18 @ 16:22)  T(F): , Max: 98.7 (04-10-18 @ 16:22)  HR: 82 (04-10-18 @ 16:22)  BP: 127/61 (04-10-18 @ 16:22)  BP(mean): --  RR: 18 (04-10-18 @ 16:22)  SpO2: 96% (04-10-18 @ 16:22)  Wt(kg): --    Height (cm): 157.48 (04-10 @ 05:05)  Weight (kg): 90.7 (04-10 @ 05:05)  BMI (kg/m2): 36.6 (04-10 @ 05:05)  BSA (m2): 1.91 (04-10 @ 05:05)    PHYSICAL EXAM:    Constitutional: NAD  HEENT: PERRLA, EOMI,  MMM  Neck: No LAD, No JVD  Respiratory: CTAB  Cardiovascular: S1 and S2, RRR  Gastrointestinal: obese  Extremities: No peripheral edema  Neurological: Alert, follows commands. KNows name, niece  : No Tamez  Skin: No rashes  Access: Not applicable        LABS:                        13.4   22.84 )-----------( 191      ( 10 Apr 2018 09:14 )             36.9     10 Apr 2018 12:44    125    |  90     |  10     ----------------------------<  267    3.3     |  21     |  0.76   10 Apr 2018 09:14    124    |  90     |  12     ----------------------------<  220    3.3     |  22     |  0.81   10 Apr 2018 04:56    121    |  85     |  16     ----------------------------<  261    3.2     |  16     |  0.98     Ca    7.6        10 Apr 2018 12:44  Ca    7.9        10 Apr 2018 09:14  Ca    8.6        10 Apr 2018 04:56  Phos  2.4       10 Apr 2018 10:30  Mg     1.4       10 Apr 2018 10:30    TPro  6.8    /  Alb  3.2    /  TBili  0.5    /  DBili  x      /  AST  46     /  ALT  68     /  AlkPhos  62     10 Apr 2018 09:14          Urine Studies:  Urinalysis Basic - ( 10 Apr 2018 05:43 )    Color: Yellow / Appearance: Clear / S.015 / pH: x  Gluc: x / Ketone: Small  / Bili: Negative / Urobili: Negative mg/dL   Blood: x / Protein: 30 mg/dL / Nitrite: Negative   Leuk Esterase: Negative / RBC: 0-2 /HPF / WBC 0-2   Sq Epi: x / Non Sq Epi: Occasional / Bacteria: Few            RADIOLOGY & ADDITIONAL STUDIES:
Patient is a 67y old  Female who presents with a chief complaint of AMS (10 Apr 2018 09:08)    HPI:  66 y/o female with past medical history diabetes, hyperlipidemia, asthma, s/p cholecystectomy and hysterectomy admitted one 4/10 for evaluation of change in mental status after being found at bottom of stairs; patient is arousable, oriented to person, place, remembers events intermittently, stating she had a cough and saw a urgicenter for bronchitis prior to admission, was given an unknown antibiotic, did have vomiting earlier. No other specific details available, history per medical record and niece at bedside.            PMH: as above  PSH: as above  Meds: per reconcilation sheet, noted below  MEDICATIONS  (STANDING):  dextrose 5%. 1000 milliLiter(s) (50 mL/Hr) IV Continuous <Continuous>  dextrose 50% Injectable 12.5 Gram(s) IV Push once  dextrose 50% Injectable 25 Gram(s) IV Push once  dextrose 50% Injectable 25 Gram(s) IV Push once  enoxaparin Injectable 40 milliGRAM(s) SubCutaneous every 24 hours  insulin lispro (HumaLOG) corrective regimen sliding scale   SubCutaneous every 6 hours  magnesium sulfate  IVPB 1 Gram(s) IV Intermittent once  piperacillin/tazobactam IVPB. 3.375 Gram(s) IV Intermittent every 8 hours  sodium chloride 0.9% with potassium chloride 20 mEq/L 1000 milliLiter(s) (125 mL/Hr) IV Continuous <Continuous>    MEDICATIONS  (PRN):  dextrose Gel 1 Dose(s) Oral once PRN Blood Glucose LESS THAN 70 milliGRAM(s)/deciliter  glucagon  Injectable 1 milliGRAM(s) IntraMuscular once PRN Glucose LESS THAN 70 milligrams/deciliter    Allergies    Allergy Status Unknown    Intolerances      Social: no smoking, no alcohol, no illegal drugs; no recent travel, no exposure to TB  FAMILY HISTORY:  No pertinent family history in first degree relatives    ROS: unable to obtain secondary to patient medical condition   Vital Signs Last 24 Hrs  T(C): 36.8 (10 Apr 2018 14:34), Max: 36.8 (10 Apr 2018 12:07)  T(F): 98.2 (10 Apr 2018 14:34), Max: 98.3 (10 Apr 2018 12:07)  HR: 88 (10 Apr 2018 14:34) (88 - 97)  BP: 126/72 (10 Apr 2018 14:34) (115/62 - 141/64)  BP(mean): --  RR: 15 (10 Apr 2018 14:34) (14 - 18)  SpO2: 99% (10 Apr 2018 14:34) (92% - 100%)  Daily Height in cm: 157.48 (10 Apr 2018 04:50)    Daily   Constitutional: frail looking  HEENT: NC, right cheek with erythyema, EOMI, PERRLA  Neck: supple, full range of motion, no lad  Respiratory: clear, no r/r/w  Cardiovascular: S1S2 regular, no murmurs  Abdomen: soft, not tender, not distended, positive BS  Genitourinary: deferred  Rectal: deferred  Musculoskeletal: no muscle tenderness, no joint swelling or tenderness  Neurological: AxOx2, moving all extremities, no focal deficits, 5/5 motor strength  Skin: no rashes                          13.4   22.84 )-----------( 191      ( 10 Apr 2018 09:14 )             36.9     04-10    125<L>  |  90<L>  |  10  ----------------------------<  267<H>  3.3<L>   |  21<L>  |  0.76    Ca    7.6<L>      10 Apr 2018 12:44  Phos  2.4     04-10  Mg     1.4     04-10    TPro  6.8  /  Alb  3.2<L>  /  TBili  0.5  /  DBili  x   /  AST  46<H>  /  ALT  68  /  AlkPhos  62  04-10     LIVER FUNCTIONS - ( 10 Apr 2018 09:14 )  Alb: 3.2 g/dL / Pro: 6.8 gm/dL / ALK PHOS: 62 U/L / ALT: 68 U/L / AST: 46 U/L / GGT: x           Urinalysis Basic - ( 10 Apr 2018 05:43 )    Color: Yellow / Appearance: Clear / S.015 / pH: x  Gluc: x / Ketone: Small  / Bili: Negative / Urobili: Negative mg/dL   Blood: x / Protein: 30 mg/dL / Nitrite: Negative   Leuk Esterase: Negative / RBC: 0-2 /HPF / WBC 0-2   Sq Epi: x / Non Sq Epi: Occasional / Bacteria: Few          Radiology:< from: CT Abdomen and Pelvis w/ IV Cont (04.10.18 @ 05:35) >    EXAM:  CT ABDOMEN AND PELVIS IC                          EXAM:  CT CHEST IC                            PROCEDURE DATE:  04/10/2018          INTERPRETATION:  CT CHEST, ABDOMEN, AND PELVIS DATED 4/10/2018.    CLINICAL INFORMATION:  Status post fall with change in mental status,   found on floor..    TECHNIQUE:  Axial CT images through the chest and abdomen are obtained   during arterial phase.  Thereafter, delayed venous phase images through   the abdomen and pelvis are acquired. Images are reformatted in the   sagittal and coronal planes. Maximum intensity projection images are   obtained. 95cc of Omnipaque 350 were administered without event.  5cc   were discarded.    No prior studies are available for comparison.    FINDINGS:    The thoracic and abdominal aorta are normal in caliber and contour.    There is no mediastinal or periaortic hematoma.  There is no evidence of   acute traumatic aortic injury.      The heart is borderline enlarged. There is no pericardial effusion.    There isno focal lung consolidation, suspicious nodule, or mass. There   is bibasilar dependent atelectasis. The central airways are patent. There   is no pleural effusion, hemothorax, or pneumothorax.     There is no significant supraclavicular, axillary, mediastinal, or hilar   lymphadenopathy.    The visualized thyroid gland is unremarkable in appearance.    There is diffuse hepatic steatosis. Cholecystectomy clips are present.   Common bile duct is minimally dilated measuring up to 7-8 mm with distal  tapering. The pancreas, spleen, adrenal glands, and kidneys are   unremarkable. The urinary bladder is unremarkable in appearance. The   uterus is absent. The adnexa are unremarkable.    There is a small hiatus hernia. There is no bowel obstructionor overt   bowel wall thickening. A normal appendix is identified. There are a few   colonic diverticula without surrounding inflammatory change. There is no   pneumoperitoneum, hemoperitoneum, or ascites. There is no evidence of a   mesenteric hematoma.    There is no significant abdominal or pelvic lymphadenopathy.    There is a tiny fat-containing umbilical hernia.    Evaluation for subtle rib fractures is limited given motion degradation.   There is no gross acute osseous fracture. There are degenerative changes   of the spine.    < end of copied text >      Advanced directive addressed: full resuscitation
Pt is a 67F brought to [facility] ED as a Trauma Code S/P found confused at the bottom of stairs in her home. No Witnessed fall. Pt confused, clear speech, restless. denies pain, other complaints    Vital Signs Last 24 Hrs  T(C): 36.3 (10 Apr 2018 07:01), Max: 36.3 (10 Apr 2018 07:01)  T(F): 97.3 (10 Apr 2018 07:01), Max: 97.3 (10 Apr 2018 07:01)  HR: 88 (10 Apr 2018 07:01) (88 - 95)  BP: 121/59 (10 Apr 2018 07:01) (121/59 - 141/64)  BP(mean): --  RR: 16 (10 Apr 2018 07:01) (14 - 16)  SpO2: 100% (10 Apr 2018 07:01) (92% - 100%)      PMH, PSH: unknown    ROS: Unable to obtain to Pt Condition    Heart: [id number]    Lungs: CTA B/L    Abd; Obese, + superficial abrasion upper abdomen, ND, Soft , + BS    Extrem : No Deformities, NVI B/L    Neuro Awake , Alert, confused, mild agitation    A/P:                       12.8   23.82 )-----------( 247      ( 10 Apr 2018 04:56 )    04-10    121<L>  |  85<L>  |  16  ----------------------------<  261<H>  3.2<L>   |  16<L>  |  0.98    Ca    8.6      10 Apr 2018 04:56                 36.7

## 2018-04-10 NOTE — H&P ADULT - NSHPLABSRESULTS_GEN_ALL_CORE
12.8   23.82 )-----------( 247      ( 10 Apr 2018 04:56 )             36.7       CBC Full  -  ( 10 Apr 2018 04:56 )  WBC Count : 23.82 K/uL  Hemoglobin : 12.8 g/dL  Hematocrit : 36.7 %  Platelet Count - Automated : 247 K/uL  Mean Cell Volume : 86.6 fl  Mean Cell Hemoglobin : 30.2 pg  Mean Cell Hemoglobin Concentration : 34.9 gm/dL  Auto Neutrophil # : 17.70 K/uL  Auto Lymphocyte # : 4.43 K/uL  Auto Monocyte # : 1.21 K/uL  Auto Eosinophil # : 0.04 K/uL  Auto Basophil # : 0.08 K/uL  Auto Neutrophil % : 74.3 %  Auto Lymphocyte % : 18.6 %  Auto Monocyte % : 5.1 %  Auto Eosinophil % : 0.2 %  Auto Basophil % : 0.3 %      0410    121<L>  |  85<L>  |  16  ----------------------------<  261<H>  3.2<L>   |  16<L>  |  0.98    Ca    8.6      10 Apr 2018 04:56                      Urinalysis Basic - ( 10 Apr 2018 05:43 )    Color: Yellow / Appearance: Clear / S.015 / pH: x  Gluc: x / Ketone: Small  / Bili: Negative / Urobili: Negative mg/dL   Blood: x / Protein: 30 mg/dL / Nitrite: Negative   Leuk Esterase: Negative / RBC: 0-2 /HPF / WBC 0-2   Sq Epi: x / Non Sq Epi: Occasional / Bacteria: Few        ABG - ( 10 Apr 2018 09:03 )  pH: 7.43  /  pCO2: 31    /  pO2: 82    / HCO3: 20    / Base Excess: -2.8  /  SaO2: 96                  MEDICATIONS  (STANDING):  sodium chloride 0.9%. 1000 milliLiter(s) (125 mL/Hr) IV Continuous <Continuous>

## 2018-04-10 NOTE — SWALLOW BEDSIDE ASSESSMENT ADULT - ORAL PREPARATORY PHASE
Pt was variably fatigued/distractible but willingly accepted PO when alert/attentive. Labial grading on utensils was functional at these times.

## 2018-04-10 NOTE — CONSULT NOTE ADULT - ASSESSMENT
Acute metabolic encephalopathy–etiology unclear, rule out sepsis.  Agree with empiric antibiotics.    Elevated iron, improving.    CBD dilation with mild elevated LFTs, alkaline phosphatase is normal.  Doubt cholangitis, however CBD at 12 mm on ultrasound is unusual for postcholecystectomy state.    Empiric antibiotics, cardiac evaluation and note appreciated. Acute metabolic encephalopathy–etiology unclear, rule out sepsis.  Agree with empiric antibiotics.    Elevated iron, improving.    CBD dilation with mild elevated LFTs, alkaline phosphatase is normal.  Doubt cholangitis, however CBD at 12 mm on ultrasound is unusual for postcholecystectomy state.  will consder MRCP if no source identified for presentation  Empiric antibiotics, cardiac evaluation and note appreciated.    DW hospitalist

## 2018-04-10 NOTE — CONSULT NOTE ADULT - ASSESSMENT
EXAM:  CT ABDOMEN AND PELVIS IC                          EXAM:  CT CHEST IC                            PROCEDURE DATE:  04/10/2018          INTERPRETATION:  CT CHEST, ABDOMEN, AND PELVIS DATED 4/10/2018.    CLINICAL INFORMATION:  Status post fall with change in mental status,   found on floor..    TECHNIQUE:  Axial CT images through the chest and abdomen are obtained   during arterial phase.  Thereafter, delayed venous phase images through   the abdomen and pelvis are acquired. Images are reformatted in the   sagittal and coronal planes. Maximum intensity projection images are   obtained. 95cc of Omnipaque 350 were administered without event.  5cc   were discarded.    No prior studies are available for comparison.    FINDINGS:    The thoracic and abdominal aorta are normal in caliber and contour.    There is no mediastinal or periaortic hematoma.  There is no evidence of   acute traumatic aortic injury.      The heart is borderline enlarged. There is no pericardial effusion.    There isno focal lung consolidation, suspicious nodule, or mass. There   is bibasilar dependent atelectasis. The central airways are patent. There   is no pleural effusion, hemothorax, or pneumothorax.     There is no significant supraclavicular, axillary, mediastinal, or hilar   lymphadenopathy.    The visualized thyroid gland is unremarkable in appearance.    There is diffuse hepatic steatosis. Cholecystectomy clips are present.   Common bile duct is minimally dilated measuring up to 7-8 mm with distal  tapering. The pancreas, spleen, adrenal glands, and kidneys are   unremarkable. The urinary bladder is unremarkable in appearance. The   uterus is absent. The adnexa are unremarkable.    There is a small hiatus hernia. There is no bowel obstructionor overt   bowel wall thickening. A normal appendix is identified. There are a few   colonic diverticula without surrounding inflammatory change. There is no   pneumoperitoneum, hemoperitoneum, or ascites. There is no evidence of a   mesenteric hematoma.    There is no significant abdominal or pelvic lymphadenopathy.    There is a tiny fat-containing umbilical hernia.    Evaluation for subtle rib fractures is limited given motion degradation.   There is no gross acute osseous fracture. There are degenerative changes   of the spine.      IMPRESSION:    No CT evidence of acute traumatic injury to the chest, abdomen, or pelvis.    A/P: Pt seen with Dr Rogel, cleared from Trauma as discussed  MS change likely secondary to Hyponatremia, Medicine to admit                                   JAMEY TORRES   This document has been electronically signed. Apr 10 2018  5:50AM

## 2018-04-10 NOTE — H&P ADULT - ASSESSMENT
68 y/o female in ED found at bottom of stairs by family with AMS  # Found on Floor /with AMS- Cannot exclude syncope  #Acute toxic metabolic encephalopathy  # Hyponatremia with elevated anion gap metabolic acidsosis r/o DKA  # leucocytosis could be reactive/ dehydration- no overt clinical signs of infection  #CBD dilation without abd pain ,LFT wnl- could be secondary to post surgical chnages from hx cholecystectomy- unlikely acute cholangitis  #Hypokalemia    Plan   Admt to tele  neurochecks  IVF, start serum ketone, ABG r/o DKA,  stat lactate , blood cx , defer abx for now until fever or overt signs of infection  insulin SC for now unless serum ketones elevated - would consult ICU for considering IV insulin drip  abd US to evaluate CBD dialation  Nephro ,endo and ID consult  cardio consult r/o cardiogenic syncope  DVT prophylaxis- lovenox SC 66 y/o female in ED found at bottom of stairs by family with AMS  # Found on Floor /with AMS- Cannot exclude syncope  #Acute toxic metabolic encephalopathy- multifactorial r/o sepsis/hyponatremia  # Hyponatremia   # compensated respiratory alkalosis could be due to hyponatremia and sepsis, dehydration - doubt PE (pulse ox 100% on RA), CT chest- no CHF or PNA  # elevated anion gap  now resolved  - likely from elevated lactate- DKA excluded   # leucocytosis could be reactive/ dehydration- r/o sepsis  #CBD dilation without abd pain ,ALT mild elevation- could be secondary to post surgical chnages from hx cholecystectomy- r/o acute cholangitis  #Hypokalemia    Plan   Admt to tele  neurochecks  IVF, start serum ketone, ABG r/o DKA,  stat lactate , blood cx , IV zosyn and IV vanco, UA ,urine cx   insulin SC for now unless serum ketones elevated - would consult ICU for considering IV insulin drip  abd US to evaluate CBD dialation and CBD stone  and lipase   Nephro  and ID consult  cardio consult r/o cardiogenic syncope  DVT prophylaxis- lovenox SC

## 2018-04-10 NOTE — H&P ADULT - HISTORY OF PRESENT ILLNESS
68 y/o female in ED found at bottom of stairs by family with AMS.  pt confused with no complaints.   visible ecchymosis to face and abd.patient was cleared by trauma team in ED - Had CT head/C spine /abd/pelvis/chest -no acute trauma .As per juan luis bowman to whom i spoke over the phone .patient at baseline is very coherent. At this time patient is oriented to self alone and does not follow all single step commands. patient is awake .  denies any pain  Patient was found to have low Na, low bicarb  with elevated anion gap and small ketones in urine , elevated WBC  no fever  patient was given IVF bolus x1 and now on IVF NS 125cc./hr started in ED    pmhx- DM on metformin ,HLD  per juan luis does not know more hx  pshx- hysterectomy and choecystectomy per juan luis  family hx -non contributory  social hx- patient lives alone, no smoking or alcohol use per juan luis

## 2018-04-10 NOTE — SWALLOW BEDSIDE ASSESSMENT ADULT - SWALLOW EVAL: FEEDING ASSISTANCE
Pt was able to feed self on exam when alert/attentive. Though she was variably fatigued/distractible on exam and may benefit from assist at these times.

## 2018-04-10 NOTE — SWALLOW BEDSIDE ASSESSMENT ADULT - COMMENTS
The patient was admitted to  with altered mentation after being found at the bottom of her stairs. Hospital course is notable for variable lethargy/confusion, facial ecchymosis, dehydration, hyponatremia, hypokalemia, low bicarb, elevated WBC, and CBD dilation on imaging. Head CT was reportedly negative. This profile is reportedly remarkable for recent bronchitis, asthma, DM, HLD and past choley.

## 2018-04-10 NOTE — CONSULT NOTE ADULT - ASSESSMENT
68 y/o female in ED found at bottom of stairs by family with AMS.  pt confused with no complaints.   visible ecchymosis to face and abd.patient was cleared by trauma team in ED - Had CT head/C spine /abd/pelvis/chest -no acute trauma .As per juan luis bowman to whom i spoke over the phone .patient at baseline is very coherent. At this time patient is oriented to self alone and does not follow all single step commands. patient is awake .Patient was found to have low Na, low bicarb  with elevated anion gap and small ketones in urine , elevated WBC  no fever  Had been treated with antibiotics over the weekend for a respiratory infection.    4/10-  normal echo with good LV function.   normal EKG without rhythm abnormalities.   Needs IV Fluids and sodium and potassium replacement.   would monitor on telemetry for an additional day.   when lab work is improved, would suggest a lexiscan nuclear stress test.

## 2018-04-10 NOTE — SWALLOW BEDSIDE ASSESSMENT ADULT - ASR SWALLOW RECOMMEND DIAG
DEFER MBS AS PT APPEARED CLINICALLY TOLERANT OF SUGGESTED PO TEXTURES FROM AN OROPHARYNGEAL SWALLOWING STANCE ON CLINICAL EXAM. PT DID NOT APPEAR TO BE ASPIRATING ON CLINICAL EXAM.

## 2018-04-10 NOTE — PATIENT PROFILE ADULT. - MEDICATION HERBAL TYPE, PROFILE
Pt says she is on supplements from chiropractor but doesn't remember what they are--*niece will find out and report back to us

## 2018-04-10 NOTE — CONSULT NOTE ADULT - ASSESSMENT
67y Female whom presented to the hospital with history diabetes, hyperlipidemia, asthma, s/p cholecystectomy and hysterectomy admitted on 4/10 for change in mental status., renal evaluation of hyponatremia/electrolyte abnormalieites. Response to IVF, multiple lyte abnormalities and her limited history of GI loss makes pre-renal/hypovolemic state more likely.     Hyponatremia  -Maintain NS hydration. No need for hypertonic at this time with reported improvement in mental state. Also, doubt mental state was soley due to Na+ values  -Urine studies  -F/u TSH  -Optimize K, will assist with Na+ as well  -Goal would be rise of na+ by about 8-10 in 24 hours in her current case  -Corrected Na+(for glucose) on admit was close to 123, currently near 127  -Bmp this PM    Lytes  -K with IVF at 20/L  -Additional K runs  -Mg/phos repltion ongoing  -Repeat value this PM    AMS/WBC  -ID/Cultures  -Abx    d/c with Harrietece  d/c with Rn  D/c with Dr. Garcia

## 2018-04-10 NOTE — SWALLOW BEDSIDE ASSESSMENT ADULT - SWALLOW EVAL: SECRETION MANAGEMENT
No drooling was noted and strength of non nutritive cough was felt to be grossly functional, albeit somewhat moist.

## 2018-04-10 NOTE — SWALLOW BEDSIDE ASSESSMENT ADULT - ASR SWALLOW LINGUAL MOBILITY
Effort was reduced when executing volitional lingual actions but tongue strength/agility were WFL on exam without evidence of an overt lingual focality.

## 2018-04-10 NOTE — SWALLOW BEDSIDE ASSESSMENT ADULT - PHARYNGEAL PHASE
Swallowing was triggered in an acceptable time frame for age. Laryngeal lift on palpation during swallow trials was felt to be functional and within age acceptable parameters. No behavioral aspiration signs exhibited on exam. Odynophagia was denied.

## 2018-04-10 NOTE — ED PROVIDER NOTE - OBJECTIVE STATEMENT
66 y/o female in ED found at bottom of stairs by family with AMS.  pt confused with no complaints.   visible ecchymosis to face and abd.

## 2018-04-10 NOTE — ED ADULT TRIAGE NOTE - CHIEF COMPLAINT QUOTE
Found at bottom of 12 stairs by a family member, unknown if fell down stairs. AMS. Not following commands. No obvious injuries. VS stable. Pupils dilated. No other information available.

## 2018-04-10 NOTE — CONSULT NOTE ADULT - ASSESSMENT
68 y/o female with past medical history diabetes, hyperlipidemia, asthma, s/p cholecystectomy and hysterectomy admitted one 4/10 for evaluation of change in mental status after being found at bottom of stairs; patient is arousable, oriented to person, place, remembers events intermittently, stating she had a cough and saw a [facility]enter for bronchitis prior to admission, was given an unknown antibiotic, did have vomiting earlier. No other specific details available, history per medical record and niece at bedside.  1. Patient admitted with change in mental status, metabolic derangements on labs as well as leukocytosis  - unclear if this is picture of sepsis  - follow up cultures   - iv hydration and supportive care   - serial cbc and monitor temperature   - will continue zosyn for now pending cultures  2. other issues; diabetes, hyperlipidemia, asthma  - per medicine

## 2018-04-10 NOTE — SWALLOW BEDSIDE ASSESSMENT ADULT - SLP GENERAL OBSERVATIONS
The patient was easily fatigued, communicatively withdrawn, and internally distractible. Cues were needed for her to sustain prolonged eye opening/joint attention. She could not be directed to structured communication tasks. With that being stated, she did often verbally respond to personally relevant questions. Her utterances were intelligible, linguistically intact and contextually appropriate without evidence of a dave primary speech-language pathology. Though it is noted that her verbalizations were often brief/variably vague and she is fatigued/distractible consistent with encephalopathy of unclear cause. Pt denied aspiration signs or odynophagia with meals when asked.

## 2018-04-10 NOTE — SWALLOW BEDSIDE ASSESSMENT ADULT - ADDITIONAL RECOMMENDATIONS
Medical follow up per hospitalist. Need to ascertain source of pt's encephalopathy. Question if it is infectious. She stated that she was juts diagnosed with bronchitis at an urgent care center and did have a non nutritive cough on exam. 1) Medical follow up per hospitalist. Need to ascertain source of pt's encephalopathy. Question if it is infectious. She stated that she was just diagnosed with bronchitis at an urgent care center and did have a non nutritive cough on exam.    2) Nutrition f/u

## 2018-04-10 NOTE — SWALLOW BEDSIDE ASSESSMENT ADULT - SWALLOW EVAL: DIAGNOSIS
1) The patient demonstrated periodically reduced alertness for feeing atop Oropharyngeal Swallowing integrity which subjectively appeared to be stable and within functional parameters for age when she was in an alert state which was not always the case. No behavioral aspiration signs were exhibited on exam. Odynophagia was denied.  2) The patient was easily fatigued, communicatively withdrawn, internally distractible and could not be directed to structured communication tasks. With that being stated, she did often verbally respond to personally relevant questions. Her utterances were intelligible, linguistically intact and contextually appropriate without evidence of a dave primary speech-language pathology. Though it is noted that her verbalizations were often brief/variably vague and she is fatigued/distractible consistent with encephalopathy of unclear cause. 1) The patient demonstrated periodically reduced alertness for feeding atop Oropharyngeal Swallowing integrity which subjectively appeared to be stable and within functional parameters for age when she was in an alert state which was not always the case. No behavioral aspiration signs were exhibited on exam. Odynophagia was denied.  2) The patient was easily fatigued, communicatively withdrawn, internally distractible and could not be directed to structured communication tasks. With that being stated, she did often verbally respond to personally relevant questions. Her utterances were intelligible, linguistically intact and contextually appropriate without evidence of a dave primary speech-language pathology at these times. Though it is noted that her verbalizations were often brief/variably vague and she is fatigued/distractible consistent with encephalopathy of unclear cause.

## 2018-04-10 NOTE — ED PROVIDER NOTE - PROGRESS NOTE DETAILS
pt evalauted by trauma team and cleared from trauma standpoint.  will admit for AMS and hyponatremia mil d/w Victor Manuel and will admit

## 2018-04-10 NOTE — PATIENT PROFILE ADULT. - VISION (WITH CORRECTIVE LENSES IF THE PATIENT USUALLY WEARS THEM):
Partially impaired: cannot see medication labels or newsprint, but can see obstacles in path, and the surrounding layout; can count fingers at arm's length/has reading glasses

## 2018-04-11 LAB
ALBUMIN SERPL ELPH-MCNC: 3 G/DL — LOW (ref 3.3–5)
ALP SERPL-CCNC: 62 U/L — SIGNIFICANT CHANGE UP (ref 40–120)
ALT FLD-CCNC: 58 U/L — SIGNIFICANT CHANGE UP (ref 12–78)
ANION GAP SERPL CALC-SCNC: 9 MMOL/L — SIGNIFICANT CHANGE UP (ref 5–17)
AST SERPL-CCNC: 41 U/L — HIGH (ref 15–37)
BILIRUB SERPL-MCNC: 0.7 MG/DL — SIGNIFICANT CHANGE UP (ref 0.2–1.2)
BUN SERPL-MCNC: 8 MG/DL — SIGNIFICANT CHANGE UP (ref 7–23)
CALCIUM SERPL-MCNC: 8.2 MG/DL — LOW (ref 8.5–10.1)
CHLORIDE SERPL-SCNC: 99 MMOL/L — SIGNIFICANT CHANGE UP (ref 96–108)
CHLORIDE UR-SCNC: 50 MMOL/L — SIGNIFICANT CHANGE UP
CO2 SERPL-SCNC: 24 MMOL/L — SIGNIFICANT CHANGE UP (ref 22–31)
CREAT ?TM UR-MCNC: 30 MG/DL — SIGNIFICANT CHANGE UP
CREAT SERPL-MCNC: 0.69 MG/DL — SIGNIFICANT CHANGE UP (ref 0.5–1.3)
CULTURE RESULTS: SIGNIFICANT CHANGE UP
GLUCOSE BLDC GLUCOMTR-MCNC: 130 MG/DL — HIGH (ref 70–99)
GLUCOSE BLDC GLUCOMTR-MCNC: 147 MG/DL — HIGH (ref 70–99)
GLUCOSE BLDC GLUCOMTR-MCNC: 159 MG/DL — HIGH (ref 70–99)
GLUCOSE BLDC GLUCOMTR-MCNC: 191 MG/DL — HIGH (ref 70–99)
GLUCOSE BLDC GLUCOMTR-MCNC: 209 MG/DL — HIGH (ref 70–99)
GLUCOSE SERPL-MCNC: 182 MG/DL — HIGH (ref 70–99)
HBA1C BLD-MCNC: 7.9 % — HIGH (ref 4–5.6)
MAGNESIUM SERPL-MCNC: 1.9 MG/DL — SIGNIFICANT CHANGE UP (ref 1.6–2.6)
OSMOLALITY SERPL: 269 MOS/KG — LOW (ref 275–300)
OSMOLALITY UR: 389 MOS/KG — SIGNIFICANT CHANGE UP (ref 50–1200)
PHOSPHATE SERPL-MCNC: 2.4 MG/DL — LOW (ref 2.5–4.5)
POTASSIUM SERPL-MCNC: 3.7 MMOL/L — SIGNIFICANT CHANGE UP (ref 3.5–5.3)
POTASSIUM SERPL-SCNC: 3.7 MMOL/L — SIGNIFICANT CHANGE UP (ref 3.5–5.3)
PROT SERPL-MCNC: 6.7 GM/DL — SIGNIFICANT CHANGE UP (ref 6–8.3)
SODIUM SERPL-SCNC: 132 MMOL/L — LOW (ref 135–145)
SODIUM UR-SCNC: 52 MMOL/L — SIGNIFICANT CHANGE UP
SPECIMEN SOURCE: SIGNIFICANT CHANGE UP

## 2018-04-11 PROCEDURE — 93010 ELECTROCARDIOGRAM REPORT: CPT

## 2018-04-11 RX ORDER — REGADENOSON 0.08 MG/ML
0.4 INJECTION, SOLUTION INTRAVENOUS ONCE
Qty: 0 | Refills: 0 | Status: DISCONTINUED | OUTPATIENT
Start: 2018-04-11 | End: 2018-04-13

## 2018-04-11 RX ORDER — ACETAMINOPHEN 500 MG
650 TABLET ORAL EVERY 6 HOURS
Qty: 0 | Refills: 0 | Status: DISCONTINUED | OUTPATIENT
Start: 2018-04-11 | End: 2018-04-12

## 2018-04-11 RX ORDER — SODIUM CHLORIDE 9 MG/ML
1000 INJECTION, SOLUTION INTRAVENOUS
Qty: 0 | Refills: 0 | Status: DISCONTINUED | OUTPATIENT
Start: 2018-04-11 | End: 2018-04-12

## 2018-04-11 RX ORDER — SODIUM CHLORIDE 9 MG/ML
1000 INJECTION, SOLUTION INTRAVENOUS
Qty: 0 | Refills: 0 | Status: DISCONTINUED | OUTPATIENT
Start: 2018-04-11 | End: 2018-04-11

## 2018-04-11 RX ORDER — POTASSIUM PHOSPHATE, MONOBASIC POTASSIUM PHOSPHATE, DIBASIC 236; 224 MG/ML; MG/ML
15 INJECTION, SOLUTION INTRAVENOUS ONCE
Qty: 0 | Refills: 0 | Status: COMPLETED | OUTPATIENT
Start: 2018-04-11 | End: 2018-04-11

## 2018-04-11 RX ADMIN — PIPERACILLIN AND TAZOBACTAM 25 GRAM(S): 4; .5 INJECTION, POWDER, LYOPHILIZED, FOR SOLUTION INTRAVENOUS at 05:22

## 2018-04-11 RX ADMIN — PIPERACILLIN AND TAZOBACTAM 25 GRAM(S): 4; .5 INJECTION, POWDER, LYOPHILIZED, FOR SOLUTION INTRAVENOUS at 21:52

## 2018-04-11 RX ADMIN — Medication 1: at 00:11

## 2018-04-11 RX ADMIN — PIPERACILLIN AND TAZOBACTAM 25 GRAM(S): 4; .5 INJECTION, POWDER, LYOPHILIZED, FOR SOLUTION INTRAVENOUS at 11:59

## 2018-04-11 RX ADMIN — ENOXAPARIN SODIUM 40 MILLIGRAM(S): 100 INJECTION SUBCUTANEOUS at 21:52

## 2018-04-11 RX ADMIN — Medication 650 MILLIGRAM(S): at 06:46

## 2018-04-11 RX ADMIN — Medication 650 MILLIGRAM(S): at 07:47

## 2018-04-11 RX ADMIN — Medication 2: at 05:31

## 2018-04-11 RX ADMIN — SODIUM CHLORIDE 60 MILLILITER(S): 9 INJECTION, SOLUTION INTRAVENOUS at 11:59

## 2018-04-11 RX ADMIN — SODIUM CHLORIDE 60 MILLILITER(S): 9 INJECTION, SOLUTION INTRAVENOUS at 02:05

## 2018-04-11 RX ADMIN — Medication 1: at 17:15

## 2018-04-11 RX ADMIN — POTASSIUM PHOSPHATE, MONOBASIC POTASSIUM PHOSPHATE, DIBASIC 62.5 MILLIMOLE(S): 236; 224 INJECTION, SOLUTION INTRAVENOUS at 11:59

## 2018-04-12 LAB
ALBUMIN SERPL ELPH-MCNC: 3 G/DL — LOW (ref 3.3–5)
ALP SERPL-CCNC: 53 U/L — SIGNIFICANT CHANGE UP (ref 40–120)
ALT FLD-CCNC: 57 U/L — SIGNIFICANT CHANGE UP (ref 12–78)
ANION GAP SERPL CALC-SCNC: 11 MMOL/L — SIGNIFICANT CHANGE UP (ref 5–17)
AST SERPL-CCNC: 60 U/L — HIGH (ref 15–37)
BASOPHILS # BLD AUTO: 0.05 K/UL — SIGNIFICANT CHANGE UP (ref 0–0.2)
BASOPHILS NFR BLD AUTO: 0.5 % — SIGNIFICANT CHANGE UP (ref 0–2)
BILIRUB SERPL-MCNC: 0.5 MG/DL — SIGNIFICANT CHANGE UP (ref 0.2–1.2)
BUN SERPL-MCNC: 10 MG/DL — SIGNIFICANT CHANGE UP (ref 7–23)
CALCIUM SERPL-MCNC: 8.5 MG/DL — SIGNIFICANT CHANGE UP (ref 8.5–10.1)
CHLORIDE SERPL-SCNC: 106 MMOL/L — SIGNIFICANT CHANGE UP (ref 96–108)
CO2 SERPL-SCNC: 24 MMOL/L — SIGNIFICANT CHANGE UP (ref 22–31)
CREAT SERPL-MCNC: 0.74 MG/DL — SIGNIFICANT CHANGE UP (ref 0.5–1.3)
EOSINOPHIL # BLD AUTO: 0.22 K/UL — SIGNIFICANT CHANGE UP (ref 0–0.5)
EOSINOPHIL NFR BLD AUTO: 2.3 % — SIGNIFICANT CHANGE UP (ref 0–6)
GLUCOSE BLDC GLUCOMTR-MCNC: 134 MG/DL — HIGH (ref 70–99)
GLUCOSE BLDC GLUCOMTR-MCNC: 187 MG/DL — HIGH (ref 70–99)
GLUCOSE BLDC GLUCOMTR-MCNC: 188 MG/DL — HIGH (ref 70–99)
GLUCOSE BLDC GLUCOMTR-MCNC: 264 MG/DL — HIGH (ref 70–99)
GLUCOSE SERPL-MCNC: 133 MG/DL — HIGH (ref 70–99)
HCT VFR BLD CALC: 35.8 % — SIGNIFICANT CHANGE UP (ref 34.5–45)
HGB BLD-MCNC: 12.4 G/DL — SIGNIFICANT CHANGE UP (ref 11.5–15.5)
IMM GRANULOCYTES NFR BLD AUTO: 0.5 % — SIGNIFICANT CHANGE UP (ref 0–1.5)
LYMPHOCYTES # BLD AUTO: 3.09 K/UL — SIGNIFICANT CHANGE UP (ref 1–3.3)
LYMPHOCYTES # BLD AUTO: 32.9 % — SIGNIFICANT CHANGE UP (ref 13–44)
MAGNESIUM SERPL-MCNC: 2 MG/DL — SIGNIFICANT CHANGE UP (ref 1.6–2.6)
MCHC RBC-ENTMCNC: 30.3 PG — SIGNIFICANT CHANGE UP (ref 27–34)
MCHC RBC-ENTMCNC: 34.6 GM/DL — SIGNIFICANT CHANGE UP (ref 32–36)
MCV RBC AUTO: 87.5 FL — SIGNIFICANT CHANGE UP (ref 80–100)
MONOCYTES # BLD AUTO: 0.7 K/UL — SIGNIFICANT CHANGE UP (ref 0–0.9)
MONOCYTES NFR BLD AUTO: 7.5 % — SIGNIFICANT CHANGE UP (ref 2–14)
NEUTROPHILS # BLD AUTO: 5.27 K/UL — SIGNIFICANT CHANGE UP (ref 1.8–7.4)
NEUTROPHILS NFR BLD AUTO: 56.3 % — SIGNIFICANT CHANGE UP (ref 43–77)
NRBC # BLD: 0 /100 WBCS — SIGNIFICANT CHANGE UP (ref 0–0)
PHOSPHATE SERPL-MCNC: 2 MG/DL — LOW (ref 2.5–4.5)
PLATELET # BLD AUTO: 162 K/UL — SIGNIFICANT CHANGE UP (ref 150–400)
POTASSIUM SERPL-MCNC: 3.4 MMOL/L — LOW (ref 3.5–5.3)
POTASSIUM SERPL-SCNC: 3.4 MMOL/L — LOW (ref 3.5–5.3)
PROT SERPL-MCNC: 6.5 GM/DL — SIGNIFICANT CHANGE UP (ref 6–8.3)
RBC # BLD: 4.09 M/UL — SIGNIFICANT CHANGE UP (ref 3.8–5.2)
RBC # FLD: 13.4 % — SIGNIFICANT CHANGE UP (ref 10.3–14.5)
SODIUM SERPL-SCNC: 141 MMOL/L — SIGNIFICANT CHANGE UP (ref 135–145)
WBC # BLD: 9.38 K/UL — SIGNIFICANT CHANGE UP (ref 3.8–10.5)
WBC # FLD AUTO: 9.38 K/UL — SIGNIFICANT CHANGE UP (ref 3.8–10.5)

## 2018-04-12 PROCEDURE — 93016 CV STRESS TEST SUPVJ ONLY: CPT

## 2018-04-12 PROCEDURE — 78452 HT MUSCLE IMAGE SPECT MULT: CPT | Mod: 26

## 2018-04-12 PROCEDURE — 93018 CV STRESS TEST I&R ONLY: CPT

## 2018-04-12 PROCEDURE — 74183 MRI ABD W/O CNTR FLWD CNTR: CPT | Mod: 26

## 2018-04-12 PROCEDURE — 93010 ELECTROCARDIOGRAM REPORT: CPT

## 2018-04-12 RX ORDER — REGADENOSON 0.08 MG/ML
0.4 INJECTION, SOLUTION INTRAVENOUS ONCE
Qty: 0 | Refills: 0 | Status: COMPLETED | OUTPATIENT
Start: 2018-04-12 | End: 2018-04-12

## 2018-04-12 RX ORDER — ALPRAZOLAM 0.25 MG
0.25 TABLET ORAL ONCE
Qty: 0 | Refills: 0 | Status: DISCONTINUED | OUTPATIENT
Start: 2018-04-12 | End: 2018-04-12

## 2018-04-12 RX ORDER — BUDESONIDE, MICRONIZED 100 %
0.5 POWDER (GRAM) MISCELLANEOUS
Qty: 0 | Refills: 0 | Status: DISCONTINUED | OUTPATIENT
Start: 2018-04-12 | End: 2018-04-13

## 2018-04-12 RX ORDER — AMLODIPINE BESYLATE 2.5 MG/1
5 TABLET ORAL DAILY
Qty: 0 | Refills: 0 | Status: DISCONTINUED | OUTPATIENT
Start: 2018-04-12 | End: 2018-04-13

## 2018-04-12 RX ORDER — POTASSIUM PHOSPHATE, MONOBASIC POTASSIUM PHOSPHATE, DIBASIC 236; 224 MG/ML; MG/ML
15 INJECTION, SOLUTION INTRAVENOUS ONCE
Qty: 0 | Refills: 0 | Status: COMPLETED | OUTPATIENT
Start: 2018-04-12 | End: 2018-04-12

## 2018-04-12 RX ADMIN — ENOXAPARIN SODIUM 40 MILLIGRAM(S): 100 INJECTION SUBCUTANEOUS at 21:02

## 2018-04-12 RX ADMIN — Medication 0.5 MILLIGRAM(S): at 19:27

## 2018-04-12 RX ADMIN — Medication 3: at 17:15

## 2018-04-12 RX ADMIN — REGADENOSON 0.4 MILLIGRAM(S): 0.08 INJECTION, SOLUTION INTRAVENOUS at 09:50

## 2018-04-12 RX ADMIN — POTASSIUM PHOSPHATE, MONOBASIC POTASSIUM PHOSPHATE, DIBASIC 62.5 MILLIMOLE(S): 236; 224 INJECTION, SOLUTION INTRAVENOUS at 17:32

## 2018-04-12 RX ADMIN — PIPERACILLIN AND TAZOBACTAM 25 GRAM(S): 4; .5 INJECTION, POWDER, LYOPHILIZED, FOR SOLUTION INTRAVENOUS at 05:01

## 2018-04-12 RX ADMIN — Medication 0.25 MILLIGRAM(S): at 14:12

## 2018-04-13 ENCOUNTER — TRANSCRIPTION ENCOUNTER (OUTPATIENT)
Age: 68
End: 2018-04-13

## 2018-04-13 VITALS
OXYGEN SATURATION: 96 % | SYSTOLIC BLOOD PRESSURE: 137 MMHG | DIASTOLIC BLOOD PRESSURE: 69 MMHG | HEART RATE: 93 BPM | TEMPERATURE: 98 F | RESPIRATION RATE: 17 BRPM

## 2018-04-13 LAB
ANION GAP SERPL CALC-SCNC: 10 MMOL/L — SIGNIFICANT CHANGE UP (ref 5–17)
BUN SERPL-MCNC: 13 MG/DL — SIGNIFICANT CHANGE UP (ref 7–23)
CALCIUM SERPL-MCNC: 8.8 MG/DL — SIGNIFICANT CHANGE UP (ref 8.5–10.1)
CHLORIDE SERPL-SCNC: 104 MMOL/L — SIGNIFICANT CHANGE UP (ref 96–108)
CO2 SERPL-SCNC: 25 MMOL/L — SIGNIFICANT CHANGE UP (ref 22–31)
CREAT SERPL-MCNC: 0.76 MG/DL — SIGNIFICANT CHANGE UP (ref 0.5–1.3)
GLUCOSE BLDC GLUCOMTR-MCNC: 154 MG/DL — HIGH (ref 70–99)
GLUCOSE BLDC GLUCOMTR-MCNC: 226 MG/DL — HIGH (ref 70–99)
GLUCOSE SERPL-MCNC: 155 MG/DL — HIGH (ref 70–99)
MAGNESIUM SERPL-MCNC: 2 MG/DL — SIGNIFICANT CHANGE UP (ref 1.6–2.6)
PHOSPHATE SERPL-MCNC: 2.7 MG/DL — SIGNIFICANT CHANGE UP (ref 2.5–4.5)
POTASSIUM SERPL-MCNC: 3.7 MMOL/L — SIGNIFICANT CHANGE UP (ref 3.5–5.3)
POTASSIUM SERPL-SCNC: 3.7 MMOL/L — SIGNIFICANT CHANGE UP (ref 3.5–5.3)
SODIUM SERPL-SCNC: 139 MMOL/L — SIGNIFICANT CHANGE UP (ref 135–145)

## 2018-04-13 RX ORDER — ZOLPIDEM TARTRATE 10 MG/1
1 TABLET ORAL
Qty: 0 | Refills: 0 | COMMUNITY

## 2018-04-13 RX ORDER — METFORMIN HYDROCHLORIDE 850 MG/1
1000 TABLET ORAL
Qty: 0 | Refills: 0 | Status: DISCONTINUED | OUTPATIENT
Start: 2018-04-13 | End: 2018-04-13

## 2018-04-13 RX ORDER — METFORMIN HYDROCHLORIDE 850 MG/1
0 TABLET ORAL
Qty: 0 | Refills: 0 | COMMUNITY

## 2018-04-13 RX ADMIN — Medication 0.5 MILLIGRAM(S): at 08:50

## 2018-04-13 RX ADMIN — AMLODIPINE BESYLATE 5 MILLIGRAM(S): 2.5 TABLET ORAL at 05:08

## 2018-04-13 RX ADMIN — Medication 2: at 11:57

## 2018-04-13 NOTE — DISCHARGE NOTE ADULT - MEDICATION SUMMARY - MEDICATIONS TO TAKE
I will START or STAY ON the medications listed below when I get home from the hospital:    enalapril 5 mg oral tablet  -- 1 tab(s) by mouth once a day  -- Indication: For .    mirtazapine 30 mg oral tablet  -- 1 tab(s) by mouth once a day (at bedtime)  -- Indication: For .    metFORMIN 1000 mg oral tablet  -- 1 tab(s) by mouth 2 times a day  -- Indication: For .    glimepiride 2 mg oral tablet  -- 1 tab(s) by mouth once a day  -- Indication: For .    lovastatin 20 mg oral tablet  -- 1 tab(s) by mouth once a day  -- Indication: For .    busPIRone 5 mg oral tablet  -- 1 tab(s) by mouth 3 times a day, As Needed  -- Indication: For .    Proventil HFA 90 mcg/inh inhalation aerosol  -- 2 puff(s) inhaled 4 times a day, As Needed  -- Indication: For .    Qvar 80 mcg/inh inhalation aerosol  -- 2 puff(s) inhaled 2 times a day  -- Indication: For .

## 2018-04-13 NOTE — PROGRESS NOTE ADULT - SUBJECTIVE AND OBJECTIVE BOX
Patient is a 67y old  Female who presents with a chief complaint of AMS (10 Apr 2018 09:08)      HPI:  66 y/o female in ED found at bottom of stairs by family with AMS.  pt confused with no complaints.   visible ecchymosis to face and abd.patient was cleared by trauma team in ED - Had CT head/C spine /abd/pelvis/chest -no acute trauma .As per juan luis bowman to whom i spoke over the phone .patient at baseline is very coherent. At this time patient is oriented to self alone and does not follow all single step commands. patient is awake .  denies any pain  Patient was found to have low Na, low bicarb  with elevated anion gap and small ketones in urine , elevated WBC  no fever  patient was given IVF bolus x1 and now on IVF NS 125cc./hr started in ED    Patient comfortable. She denies any abdominal pain. She had some mild reflux after.  Negative nausea or vomiting.  Negative chest pain or shortness of breath.  Positive fatigue.  MRI results reviewed.      pmhx- DM on metformin ,HLD  per juan luis does not know more hx  pshx- hysterectomy and choecystectomy per juan luis  family hx -non contributory  social hx- patient lives alone, no smoking or alcohol use per juan luis (10 Apr 2018 09:08)      PAST MEDICAL & SURGICAL HISTORY:      MEDICATIONS  (STANDING):  amLODIPine   Tablet 5 milliGRAM(s) Oral daily  buDESOnide   0.5 milliGRAM(s) Respule 0.5 milliGRAM(s) Inhalation two times a day  dextrose 5%. 1000 milliLiter(s) (50 mL/Hr) IV Continuous <Continuous>  dextrose 50% Injectable 12.5 Gram(s) IV Push once  dextrose 50% Injectable 25 Gram(s) IV Push once  dextrose 50% Injectable 25 Gram(s) IV Push once  enoxaparin Injectable 40 milliGRAM(s) SubCutaneous every 24 hours  insulin lispro (HumaLOG) corrective regimen sliding scale   SubCutaneous every 6 hours  regadenoson Injectable 0.4 milliGRAM(s) IV Push once    MEDICATIONS  (PRN):  dextrose Gel 1 Dose(s) Oral once PRN Blood Glucose LESS THAN 70 milliGRAM(s)/deciliter  glucagon  Injectable 1 milliGRAM(s) IntraMuscular once PRN Glucose LESS THAN 70 milligrams/deciliter      Allergies    penicillin (Urticaria)    Intolerances        SOCIAL HISTORY:NC    FAMILY HISTORY:  No pertinent family history in first degree relatives      REVIEW OF SYSTEMS:    CONSTITUTIONAL: No weakness, fevers or chills  EYES/ENT: No visual changes;  No vertigo or throat pain   NECK: No pain or stiffness  RESPIRATORY: No cough, wheezing, hemoptysis; No shortness of breath  CARDIOVASCULAR: No chest pain or palpitations  GENITOURINARY: No dysuria, frequency or hematuria  NEUROLOGICAL: No numbness or weakness  SKIN: No itching, burning, rashes, or lesions   All other review of systems is negative unless indicated above.    Vital Signs Last 24 Hrs  T(C): 36.8 (13 Apr 2018 05:02), Max: 36.9 (12 Apr 2018 16:59)  T(F): 98.3 (13 Apr 2018 05:02), Max: 98.4 (12 Apr 2018 16:59)  HR: 77 (13 Apr 2018 05:02) (70 - 104)  BP: 120/63 (13 Apr 2018 05:02) (120/63 - 154/81)  BP(mean): --  RR: 16 (13 Apr 2018 05:02) (16 - 16)  SpO2: 96% (13 Apr 2018 05:02) (96% - 98%)    PHYSICAL EXAM:    Constitutional: NAD, well-developed  HEENT: EOMI, throat clear  Neck: No LAD, supple  Respiratory: CTA and P  Cardiovascular: S1 and S2, RRR, no M  Gastrointestinal: BS+, soft, NT/ND, neg HSM,  Extremities: No peripheral edema, neg clubing, cyanosis  Vascular: 2+ peripheral pulses  Neurological: A/O x 3, no focal deficits  Psychiatric: Normal mood, normal affect  Skin: No rashes    LABS:  CBC Full  -  ( 12 Apr 2018 05:52 )  WBC Count : 9.38 K/uL  Hemoglobin : 12.4 g/dL  Hematocrit : 35.8 %  Platelet Count - Automated : 162 K/uL  Mean Cell Volume : 87.5 fl  Mean Cell Hemoglobin : 30.3 pg  Mean Cell Hemoglobin Concentration : 34.6 gm/dL  Auto Neutrophil # : 5.27 K/uL  Auto Lymphocyte # : 3.09 K/uL  Auto Monocyte # : 0.70 K/uL  Auto Eosinophil # : 0.22 K/uL  Auto Basophil # : 0.05 K/uL  Auto Neutrophil % : 56.3 %  Auto Lymphocyte % : 32.9 %  Auto Monocyte % : 7.5 %  Auto Eosinophil % : 2.3 %  Auto Basophil % : 0.5 %    04-13    139  |  104  |  13  ----------------------------<  155<H>  3.7   |  25  |  0.76    Ca    8.8      13 Apr 2018 05:52  Phos  2.7     04-13  Mg     2.0     04-13    TPro  6.5  /  Alb  3.0<L>  /  TBili  0.5  /  DBili  x   /  AST  60<H>  /  ALT  57  /  AlkPhos  53  04-12            RADIOLOGY & ADDITIONAL STUDIES:  < from: MR Abdomen w/wo IV Cont (04.12.18 @ 13:40) >  EXAM:  MR ABDOMEN WAW IC                            PROCEDURE DATE:  04/12/2018          INTERPRETATION:  MR ABDOMEN WAW IC    HISTORY:  dilated bile duct    TECHNIQUE: Multiplanar T1-weighted, T2-weighted, and diffusion weighted   sequences were obtained of the abdomen, including dynamic post-contrast   T1-weighted sequences.  3D heavily T2-weighted thin-section MRCP was also   performed.    Field Strength: 1.5T    Contrast: 9 cc Gadavist.    COMPARISON: Abdominal ultrasound and CT abdomen andpelvis 4/10/2018.    FINDINGS:    LIVER: Diffuse steatosis.  SPLEEN: Normal.    PANCREAS: Normal signal and enhancement. No main duct dilatation or   peripancreatic abnormality. No mass.    GALLBLADDER/BILIARY TREE: Nondilated. Normal distal tapering. Common duct   measures 8 mm. No intrahepatic dilatation. Cystic duct stump is   unremarkable.    ADRENALS: Normal.  KIDNEYS: Symmetric nephrograms. No hydronephrosis.  LYMPHADENOPATHY/RETROPERITONEUM: No adenopathy.  VASCULATURE: Normal caliber aorta.  BOWEL: No bowel-related abnormality.  PERITONEUM/ABDOMINAL WALL: No ascites.  SKELETAL: No aggressive lesion.  LUNG BASES: Clear.    IMPRESSION:    Normal postcholecystectomy appearance of the biliary tree.          < end of copied text >
Patient is a 67y old  Female who presents with a chief complaint of AMS (10 Apr 2018 09:08)    Date of service: 04-13-18 @ 14:17  Patient comfortable  ROS: no fever or chills; denies dizziness, no HA, no SOB or cough, no abdominal pain, no diarrhea or constipation; no dysuria, no urinary frequency, no legs pain, no rashes    MEDICATIONS  (STANDING):  amLODIPine   Tablet 5 milliGRAM(s) Oral daily  buDESOnide   0.5 milliGRAM(s) Respule 0.5 milliGRAM(s) Inhalation two times a day  dextrose 5%. 1000 milliLiter(s) (50 mL/Hr) IV Continuous <Continuous>  dextrose 50% Injectable 12.5 Gram(s) IV Push once  dextrose 50% Injectable 25 Gram(s) IV Push once  dextrose 50% Injectable 25 Gram(s) IV Push once  enoxaparin Injectable 40 milliGRAM(s) SubCutaneous every 24 hours  insulin lispro (HumaLOG) corrective regimen sliding scale   SubCutaneous every 6 hours  regadenoson Injectable 0.4 milliGRAM(s) IV Push once    MEDICATIONS  (PRN):  dextrose Gel 1 Dose(s) Oral once PRN Blood Glucose LESS THAN 70 milliGRAM(s)/deciliter  glucagon  Injectable 1 milliGRAM(s) IntraMuscular once PRN Glucose LESS THAN 70 milligrams/deciliter      Vital Signs Last 24 Hrs  T(C): 36.9 (13 Apr 2018 10:16), Max: 36.9 (12 Apr 2018 16:59)  T(F): 98.4 (13 Apr 2018 10:16), Max: 98.4 (12 Apr 2018 16:59)  HR: 93 (13 Apr 2018 10:16) (70 - 104)  BP: 137/69 (13 Apr 2018 10:16) (120/63 - 137/69)  BP(mean): --  RR: 17 (13 Apr 2018 10:16) (16 - 17)  SpO2: 96% (13 Apr 2018 10:16) (96% - 96%)    Physical Exam:        Daily   Constitutional: frail looking  HEENT: NC, right cheek with erythyema, EOMI, PERRLA  Neck: supple, full range of motion, no lad  Respiratory: clear, no r/r/w  Cardiovascular: S1S2 regular, no murmurs  Abdomen: soft, not tender, not distended, positive BS  Musculoskeletal: no muscle tenderness, no joint swelling or tenderness  Neurological: AxOx3, moving all extremities, no focal deficits, 5/5 motor strength  Skin: no rashes           Labs:                        12.4   9.38  )-----------( 162      ( 12 Apr 2018 05:52 )             35.8     04-13    139  |  104  |  13  ----------------------------<  155<H>  3.7   |  25  |  0.76    Ca    8.8      13 Apr 2018 05:52  Phos  2.7     04-13  Mg     2.0     04-13    TPro  6.5  /  Alb  3.0<L>  /  TBili  0.5  /  DBili  x   /  AST  60<H>  /  ALT  57  /  AlkPhos  53  04-12           Cultures:       Culture - Urine (collected 04-10-18 @ 17:45)  Source: .Urine Clean Catch (Midstream)  Final Report (04-11-18 @ 21:57):    <10,000 CFU/ml    Normal Urogenital davey present    Culture - Blood (collected 04-10-18 @ 10:36)  Source: .Blood None  Preliminary Report (04-11-18 @ 15:05):    No growth to date.    Culture - Blood (collected 04-10-18 @ 10:30)  Source: .Blood None  Preliminary Report (04-11-18 @ 15:05):    No growth to date.              Radiology:< from: CT Abdomen and Pelvis w/ IV Cont (04.10.18 @ 05:35) >    EXAM:  CT ABDOMEN AND PELVIS IC                          EXAM:  CT CHEST IC                            PROCEDURE DATE:  04/10/2018          INTERPRETATION:  CT CHEST, ABDOMEN, AND PELVIS DATED 4/10/2018.    CLINICAL INFORMATION:  Status post fall with change in mental status,   found on floor..    TECHNIQUE:  Axial CT images through the chest and abdomen are obtained   during arterial phase.  Thereafter, delayed venous phase images through   the abdomen and pelvis are acquired. Images are reformatted in the   sagittal and coronal planes. Maximum intensity projection images are   obtained. 95cc of Omnipaque 350 were administered without event.  5cc   were discarded.    No prior studies are available for comparison.    FINDINGS:    The thoracic and abdominal aorta are normal in caliber and contour.    There is no mediastinal or periaortic hematoma.  There is no evidence of   acute traumatic aortic injury.      The heart is borderline enlarged. There is no pericardial effusion.    There isno focal lung consolidation, suspicious nodule, or mass. There   is bibasilar dependent atelectasis. The central airways are patent. There   is no pleural effusion, hemothorax, or pneumothorax.     There is no significant supraclavicular, axillary, mediastinal, or hilar   lymphadenopathy.    The visualized thyroid gland is unremarkable in appearance.    There is diffuse hepatic steatosis. Cholecystectomy clips are present.   Common bile duct is minimally dilated measuring up to 7-8 mm with distal  tapering. The pancreas, spleen, adrenal glands, and kidneys are   unremarkable. The urinary bladder is unremarkable in appearance. The   uterus is absent. The adnexa are unremarkable.    There is a small hiatus hernia. There is no bowel obstructionor overt   bowel wall thickening. A normal appendix is identified. There are a few   colonic diverticula without surrounding inflammatory change. There is no   pneumoperitoneum, hemoperitoneum, or ascites. There is no evidence of a   mesenteric hematoma.    There is no significant abdominal or pelvic lymphadenopathy.    There is a tiny fat-containing umbilical hernia.    Evaluation for subtle rib fractures is limited given motion degradation.   There is no gross acute osseous fracture. There are degenerative changes   of the spine.    < end of copied text >      Advanced directive addressed: full resuscitation
66 y/o female in ED found at bottom of stairs by family with AMS.  pt confused with no complaints.   visible ecchymosis to face and abd.patient was cleared by trauma team in ED - Had CT head/C spine /abd/pelvis/chest -no acute trauma .As per juan luis bowman to whom i spoke over the phone .patient at baseline is very coherent. At this time patient is oriented to self alone and does not follow all single step commands. patient is awake .  denies any pain  Patient was found to have low Na, low bicarb  with elevated anion gap and small ketones in urine , elevated WBC  no fever  patient was given IVF bolus x1 and now on IVF NS 125cc./hr started in ED  4/11- patient alert and awake , able to take po ,no comaplinst  4/12- IV abx discontinued today, mentation back to baseline, patient to go for MRCP today ,if wnl then d/c patient after PT eval     pmhx- DM on metformin ,HLD  per juan luis does not know more hx  pshx- hysterectomy and choecystectomy per juan luis  family hx -non contributory  social hx- patient lives alone, no smoking or alcohol use per neiceConstitutional: NAD ,sleeping ,but easily arousable   	HEENT: Atraumatic, EDGAR, Normal, No congestion  	Respiratory: Breath Sounds normal, no rhonchi/wheeze  	Cardiovascular: N S1S2; RANDALL present  	Gastrointestinal: Abdomen soft, non tender, Bowel Ssounds present  	Extremities: No edema, peripheral pulses present  no focal neuro deficits grossly  	Skin: Non cellulitic,    	Back: No CVA tenderness   	Musculoskeletal: non tender  	Breasts: Deferred      PHYSICAL EXAM:    Daily     Daily     ICU Vital Signs Last 24 Hrs  T(C): 36.7 (12 Apr 2018 12:12), Max: 37.1 (11 Apr 2018 23:59)  T(F): 98.1 (12 Apr 2018 12:12), Max: 98.8 (11 Apr 2018 23:59)  HR: 94 (12 Apr 2018 12:12) (87 - 106)  BP: 154/81 (12 Apr 2018 12:12) (115/58 - 154/81)  BP(mean): 89 (11 Apr 2018 19:00) (71 - 89)  ABP: --  ABP(mean): --  RR: 16 (12 Apr 2018 12:12) (16 - 23)  SpO2: 98% (12 Apr 2018 12:12) (95% - 98%)                                12.4   9.38  )-----------( 162      ( 12 Apr 2018 05:52 )             35.8       CBC Full  -  ( 12 Apr 2018 05:52 )  WBC Count : 9.38 K/uL  Hemoglobin : 12.4 g/dL  Hematocrit : 35.8 %  Platelet Count - Automated : 162 K/uL  Mean Cell Volume : 87.5 fl  Mean Cell Hemoglobin : 30.3 pg  Mean Cell Hemoglobin Concentration : 34.6 gm/dL  Auto Neutrophil # : 5.27 K/uL  Auto Lymphocyte # : 3.09 K/uL  Auto Monocyte # : 0.70 K/uL  Auto Eosinophil # : 0.22 K/uL  Auto Basophil # : 0.05 K/uL  Auto Neutrophil % : 56.3 %  Auto Lymphocyte % : 32.9 %  Auto Monocyte % : 7.5 %  Auto Eosinophil % : 2.3 %  Auto Basophil % : 0.5 %      04-12    141  |  106  |  10  ----------------------------<  133<H>  3.4<L>   |  24  |  0.74    Ca    8.5      12 Apr 2018 05:52  Phos  2.0     04-12  Mg     2.0     04-12    TPro  6.5  /  Alb  3.0<L>  /  TBili  0.5  /  DBili  x   /  AST  60<H>  /  ALT  57  /  AlkPhos  53  04-12      LIVER FUNCTIONS - ( 12 Apr 2018 05:52 )  Alb: 3.0 g/dL / Pro: 6.5 gm/dL / ALK PHOS: 53 U/L / ALT: 57 U/L / AST: 60 U/L / GGT: x                               MEDICATIONS  (STANDING):  dextrose 5%. 1000 milliLiter(s) (50 mL/Hr) IV Continuous <Continuous>  dextrose 50% Injectable 12.5 Gram(s) IV Push once  dextrose 50% Injectable 25 Gram(s) IV Push once  dextrose 50% Injectable 25 Gram(s) IV Push once  enoxaparin Injectable 40 milliGRAM(s) SubCutaneous every 24 hours  insulin lispro (HumaLOG) corrective regimen sliding scale   SubCutaneous every 6 hours  potassium phosphate IVPB 15 milliMole(s) IV Intermittent once  regadenoson Injectable 0.4 milliGRAM(s) IV Push once  sodium chloride 0.45% 1000 milliLiter(s) (60 mL/Hr) IV Continuous <Continuous>
History of Present Illness: 	  66 y/o female in ED found at bottom of stairs by family with AMS.  pt confused with no complaints.   visible ecchymosis to face and abd.patient was cleared by trauma team in ED - Had CT head/C spine /abd/pelvis/chest -no acute trauma .As per juan luis bowman to whom i spoke over the phone .patient at baseline is very coherent. At this time patient is oriented to self alone and does not follow all single step commands. patient is awake .  denies any pain  Patient was found to have low Na, low bicarb  with elevated anion gap and small ketones in urine , elevated WBC  no fever  patient was given IVF bolus x1 and now on IVF NS 125cc./hr started in ED  - patient alert and awake , able to take po ,no comaplinst    pmhx- DM on metformin ,HLD  per juan luis does not know more hx  pshx- hysterectomy and choecystectomy per juan luis  family hx -non contributory  social hx- patient lives alone, no smoking or alcohol use per juan luisConstitutional: NAD ,sleeping ,but easily arousable   	HEENT: Atraumatic, EDGAR, Normal, No congestion  	Respiratory: Breath Sounds normal, no rhonchi/wheeze  	Cardiovascular: N S1S2; RANDALL present  	Gastrointestinal: Abdomen soft, non tender, Bowel Ssounds present  	Extremities: No edema, peripheral pulses present  no focal neuro deficits grossly  	Skin: Non cellulitic,    	Back: No CVA tenderness   	Musculoskeletal: non tender  	Breasts: Deferred      PHYSICAL EXAM:    Daily     Daily     ICU Vital Signs Last 24 Hrs  T(C): 36.8 (2018 12:00), Max: 37.4 (2018 06:21)  T(F): 98.2 (2018 12:00), Max: 99.4 (2018 06:21)  HR: 91 (2018 12:01) (82 - 96)  BP: 119/70 (2018 12:01) (85/72 - 128/59)  BP(mean): 83 (2018 12:01) (62 - 83)  ABP: --  ABP(mean): --  RR: 14 (2018 12:01) (14 - 21)  SpO2: 99% (2018 12:01) (90% - 99%)                              13.4   22.84 )-----------( 191      ( 10 Apr 2018 09:14 )             36.9       CBC Full  -  ( 10 Apr 2018 09:14 )  WBC Count : 22.84 K/uL  Hemoglobin : 13.4 g/dL  Hematocrit : 36.9 %  Platelet Count - Automated : 191 K/uL  Mean Cell Volume : 83.3 fl  Mean Cell Hemoglobin : 30.2 pg  Mean Cell Hemoglobin Concentration : 36.3 gm/dL  Auto Neutrophil # : 18.17 K/uL  Auto Lymphocyte # : 3.24 K/uL  Auto Monocyte # : 1.13 K/uL  Auto Eosinophil # : 0.03 K/uL  Auto Basophil # : 0.07 K/uL  Auto Neutrophil % : 79.6 %  Auto Lymphocyte % : 14.2 %  Auto Monocyte % : 4.9 %  Auto Eosinophil % : 0.1 %  Auto Basophil % : 0.3 %      -11    132<L>  |  99  |  8   ----------------------------<  182<H>  3.7   |  24  |  0.69    Ca    8.2<L>      2018 05:03  Phos  2.4     -  Mg     1.9     04-11    TPro  6.7  /  Alb  3.0<L>  /  TBili  0.7  /  DBili  x   /  AST  41<H>  /  ALT  58  /  AlkPhos  62  04-11      LIVER FUNCTIONS - ( 2018 05:03 )  Alb: 3.0 g/dL / Pro: 6.7 gm/dL / ALK PHOS: 62 U/L / ALT: 58 U/L / AST: 41 U/L / GGT: x                 CARDIAC MARKERS ( 10 Apr 2018 12:44 )  0.147 ng/mL / x     / x     / x     / x            Urinalysis Basic - ( 10 Apr 2018 05:43 )    Color: Yellow / Appearance: Clear / S.015 / pH: x  Gluc: x / Ketone: Small  / Bili: Negative / Urobili: Negative mg/dL   Blood: x / Protein: 30 mg/dL / Nitrite: Negative   Leuk Esterase: Negative / RBC: 0-2 /HPF / WBC 0-2   Sq Epi: x / Non Sq Epi: Occasional / Bacteria: Few        ABG - ( 10 Apr 2018 09:03 )  pH: 7.43  /  pCO2: 31    /  pO2: 82    / HCO3: 20    / Base Excess: -2.8  /  SaO2: 96                  MEDICATIONS  (STANDING):  dextrose 5%. 1000 milliLiter(s) (50 mL/Hr) IV Continuous <Continuous>  dextrose 50% Injectable 12.5 Gram(s) IV Push once  dextrose 50% Injectable 25 Gram(s) IV Push once  dextrose 50% Injectable 25 Gram(s) IV Push once  enoxaparin Injectable 40 milliGRAM(s) SubCutaneous every 24 hours  insulin lispro (HumaLOG) corrective regimen sliding scale   SubCutaneous every 6 hours  piperacillin/tazobactam IVPB. 3.375 Gram(s) IV Intermittent every 8 hours  regadenoson Injectable 0.4 milliGRAM(s) IV Push once  sodium chloride 0.45% 1000 milliLiter(s) (60 mL/Hr) IV Continuous <Continuous>
Patient is a 67y Female who reports no complaints overnight.    alert and conversant.   states still having diarrhea but less     REVIEW OF SYSTEMS:    CONSTITUTIONAL: Improved weakness, no fevers or chills  RESPIRATORY: No cough, wheezing, hemoptysis; No shortness of breath  CARDIOVASCULAR: No chest pain or palpitations  GENITOURINARY: No dysuria, frequency or hematuria  All other review of systems is negative unless indicated above.    MEDICATIONS  (STANDING):  amLODIPine   Tablet 5 milliGRAM(s) Oral daily  buDESOnide   0.5 milliGRAM(s) Respule 0.5 milliGRAM(s) Inhalation two times a day  dextrose 5%. 1000 milliLiter(s) (50 mL/Hr) IV Continuous <Continuous>  dextrose 50% Injectable 12.5 Gram(s) IV Push once  dextrose 50% Injectable 25 Gram(s) IV Push once  dextrose 50% Injectable 25 Gram(s) IV Push once  enoxaparin Injectable 40 milliGRAM(s) SubCutaneous every 24 hours  insulin lispro (HumaLOG) corrective regimen sliding scale   SubCutaneous every 6 hours  regadenoson Injectable 0.4 milliGRAM(s) IV Push once    MEDICATIONS  (PRN):  dextrose Gel 1 Dose(s) Oral once PRN Blood Glucose LESS THAN 70 milliGRAM(s)/deciliter  glucagon  Injectable 1 milliGRAM(s) IntraMuscular once PRN Glucose LESS THAN 70 milligrams/deciliter        Vital Signs Last 24 Hrs  T(C): 36.9 (2018 16:59), Max: 37.1 (2018 23:59)  T(F): 98.4 (2018 16:59), Max: 98.8 (2018 23:59)  HR: 70 (2018 19:27) (70 - 106)  BP: 135/67 (2018 16:59) (126/69 - 154/81)  BP(mean): --  RR: 16 (2018 16:59) (16 - 23)  SpO2: 96% (2018 16:59) (95% - 98%)  I&O's Summary    2018 07:01  -  2018 07:00  --------------------------------------------------------  IN: 1070 mL / OUT: 1100 mL / NET: -30 mL        PHYSICAL EXAM:    Constitutional: NAD, morbidly obese  HEENT:, EOMI,  MMM  Neck: No LAD, No JVD  Respiratory: clear, dist BS  Cardiovascular: S1 and S2, RRR  Gastrointestinal: BS+, soft, NT/ND  Extremities: chronic changes  Neurological: A/O x 3, no focal deficits  Psychiatric: Normal mood, normal affect  : No Tamez  Skin: No rashes  Access: Not applicable        LABS:                                       12.4   9.38  )-----------( 162      ( 2018 05:52 )             35.8     141    |  106    |  10     ----------------------------<  133       2018 05:52  3.4     |  24     |  0.74     132    |  99     |  8      ----------------------------<  182       2018 05:03  3.7     |  24     |  0.69     128    |  94     |  9      ----------------------------<  214       10 Apr 2018 20:01  3.5     |  23     |  0.66     Ca    8.5        2018 05:52  Ca    8.2        2018 05:03    Phos  2.0       2018 05:52  Phos  2.4       2018 05:03    Mg     2.0       2018 05:52  Mg     1.9       2018 05:03    Hemoglobin A1C, Whole Blood: 7.9 % <H> [4.0 - 5.6] ( @ 05:03)      Urine Studies:  Urinalysis Basic - ( 10 Apr 2018 05:43 )    Color: Yellow / Appearance: Clear / S.015 / pH: x  Gluc: x / Ketone: Small  / Bili: Negative / Urobili: Negative mg/dL   Blood: x / Protein: 30 mg/dL / Nitrite: Negative   Leuk Esterase: Negative / RBC: 0-2 /HPF / WBC 0-2   Sq Epi: x / Non Sq Epi: Occasional / Bacteria: Few      Sodium, Random Urine: 52 mmol/L (04-10 @ 17:00)  Chloride, Random Urine: 50 mmol/L (04-10 @ 17:00)  Creatinine, Random Urine: 30 mg/dL (04-10 @ 17:00)        RADIOLOGY & ADDITIONAL STUDIES:
Patient is a 67y Female who reports no complaints overnight. Feels much better, much more alert and conversant.     REVIEW OF SYSTEMS:    CONSTITUTIONAL: Improved weakness, no fevers or chills  RESPIRATORY: No cough, wheezing, hemoptysis; No shortness of breath  CARDIOVASCULAR: No chest pain or palpitations  GENITOURINARY: No dysuria, frequency or hematuria  All other review of systems is negative unless indicated above.    MEDICATIONS  (STANDING):  dextrose 5%. 1000 milliLiter(s) (50 mL/Hr) IV Continuous <Continuous>  dextrose 50% Injectable 12.5 Gram(s) IV Push once  dextrose 50% Injectable 25 Gram(s) IV Push once  dextrose 50% Injectable 25 Gram(s) IV Push once  enoxaparin Injectable 40 milliGRAM(s) SubCutaneous every 24 hours  insulin lispro (HumaLOG) corrective regimen sliding scale   SubCutaneous every 6 hours  piperacillin/tazobactam IVPB. 3.375 Gram(s) IV Intermittent every 8 hours  regadenoson Injectable 0.4 milliGRAM(s) IV Push once  sodium chloride 0.45% 1000 milliLiter(s) (60 mL/Hr) IV Continuous <Continuous>    MEDICATIONS  (PRN):  acetaminophen   Tablet. 650 milliGRAM(s) Oral every 6 hours PRN Moderate Pain (4 - 6)  dextrose Gel 1 Dose(s) Oral once PRN Blood Glucose LESS THAN 70 milliGRAM(s)/deciliter  glucagon  Injectable 1 milliGRAM(s) IntraMuscular once PRN Glucose LESS THAN 70 milligrams/deciliter        T(C): , Max: 37.4 (18 @ 06:21)  T(F): , Max: 99.4 (18 @ 06:21)  HR: 89 (18 @ 06:00)  BP: 89/63 (18 @ 06:00)  BP(mean): 69 (18 @ 06:00)  RR: 14 (18 @ 06:00)  SpO2: 95% (18 @ 06:00)  Wt(kg): --    04-10 @ 07:01  -   07:00  --------------------------------------------------------  IN: 900 mL / OUT: 901 mL / NET: -1 mL     @ 07:01  -   @ 11:30  --------------------------------------------------------  IN: 0 mL / OUT: 1100 mL / NET: -1100 mL      PHYSICAL EXAM:    Constitutional: NAD, morbidly obese  HEENT: PERRLA, EOMI,  MMM  Neck: No LAD, No JVD  Respiratory: clear, dist BS  Cardiovascular: S1 and S2, RRR  Gastrointestinal: BS+, soft, NT/ND  Extremities: chronic changes  Neurological: A/O x 3, no focal deficits  Psychiatric: Normal mood, normal affect  : No Tamez  Skin: No rashes  Access: Not applicable        LABS:                        13.4   22.84 )-----------( 191      ( 10 Apr 2018 09:14 )             36.9     2018 05:03    132    |  99     |  8      ----------------------------<  182    3.7     |  24     |  0.69   10 Apr 2018 20:01    128    |  94     |  9      ----------------------------<  214    3.5     |  23     |  0.66   10 Apr 2018 16:35    125    |  91     |  9      ----------------------------<  222    3.6     |  23     |  0.75   10 Apr 2018 12:44    125    |  90     |  10     ----------------------------<  267    3.3     |  21     |  0.76   10 Apr 2018 09:14    124    |  90     |  12     ----------------------------<  220    3.3     |  22     |  0.81     Ca    8.2        2018 05:03  Ca    8.2        10 Apr 2018 20:01  Ca    8.0        10 Apr 2018 16:35  Ca    7.6        10 Apr 2018 12:44  Ca    7.9        10 Apr 2018 09:14  Phos  2.4       2018 05:03  Phos  2.9       10 Apr 2018 20:01  Phos  2.4       10 Apr 2018 10:30  Mg     1.9       2018 05:03  Mg     1.9       10 Apr 2018 20:01  Mg     1.6       10 Apr 2018 16:35  Mg     1.4       10 Apr 2018 10:30    TPro  6.7    /  Alb  3.0    /  TBili  0.7    /  DBili  x      /  AST  41     /  ALT  58     /  AlkPhos  62     2018 05:03  TPro  6.8    /  Alb  3.2    /  TBili  0.5    /  DBili  x      /  AST  46     /  ALT  68     /  AlkPhos  62     10 Apr 2018 09:14      Hemoglobin A1C, Whole Blood: 7.9 % <H> [4.0 - 5.6] ( @ 05:03)      Urine Studies:  Urinalysis Basic - ( 10 Apr 2018 05:43 )    Color: Yellow / Appearance: Clear / S.015 / pH: x  Gluc: x / Ketone: Small  / Bili: Negative / Urobili: Negative mg/dL   Blood: x / Protein: 30 mg/dL / Nitrite: Negative   Leuk Esterase: Negative / RBC: 0-2 /HPF / WBC 0-2   Sq Epi: x / Non Sq Epi: Occasional / Bacteria: Few      Sodium, Random Urine: 52 mmol/L (04-10 @ 17:00)  Chloride, Random Urine: 50 mmol/L (04-10 @ 17:00)  Creatinine, Random Urine: 30 mg/dL (04-10 @ 17:00)        RADIOLOGY & ADDITIONAL STUDIES:
Patient is a 67y old  Female who presents with a chief complaint of AMS (10 Apr 2018 09:08)      HPI:  66 y/o female in ED found at bottom of stairs by family with AMS.  pt confused with no complaints.   visible ecchymosis to face and abd.patient was cleared by trauma team in ED - Had CT head/C spine /abd/pelvis/chest -no acute trauma .As per juan luis bowman to whom i spoke over the phone .patient at baseline is very coherent. At this time patient is oriented to self alone and does not follow all single step commands. patient is awake .  denies any pain  Patient was found to have low Na, low bicarb  with elevated anion gap and small ketones in urine , elevated WBC  no fever  patient was given IVF bolus x1 and now on IVF NS 125cc./hr started in ED    Patient now recalls having had taken prednisone and antibiotics. Thinks that maybe her blood sugars run out of control. Had some nausea vomiting. However, now she feels better. She denies any abdominal pain and nausea vomiting.  She's had a colonoscopy within the last few years but can't recall who did it for her.  Eyes any chest pain or shortness of breath.      pmhx- DM on metformin ,HLD  per juan luis does not know more hx  pshx- hysterectomy and choecystectomy per juan luis  family hx -non contributory  social hx- patient lives alone, no smoking or alcohol use per juan luis (10 Apr 2018 09:08)          PAST MEDICAL & SURGICAL HISTORY:      MEDICATIONS  (STANDING):  dextrose 5%. 1000 milliLiter(s) (50 mL/Hr) IV Continuous <Continuous>  dextrose 50% Injectable 12.5 Gram(s) IV Push once  dextrose 50% Injectable 25 Gram(s) IV Push once  dextrose 50% Injectable 25 Gram(s) IV Push once  enoxaparin Injectable 40 milliGRAM(s) SubCutaneous every 24 hours  insulin lispro (HumaLOG) corrective regimen sliding scale   SubCutaneous every 6 hours  piperacillin/tazobactam IVPB. 3.375 Gram(s) IV Intermittent every 8 hours  regadenoson Injectable 0.4 milliGRAM(s) IV Push once  sodium chloride 0.45% 1000 milliLiter(s) (60 mL/Hr) IV Continuous <Continuous>    MEDICATIONS  (PRN):  acetaminophen   Tablet. 650 milliGRAM(s) Oral every 6 hours PRN Moderate Pain (4 - 6)  dextrose Gel 1 Dose(s) Oral once PRN Blood Glucose LESS THAN 70 milliGRAM(s)/deciliter  glucagon  Injectable 1 milliGRAM(s) IntraMuscular once PRN Glucose LESS THAN 70 milligrams/deciliter      Allergies    penicillin (Urticaria)    Intolerances        SOCIAL HISTORY:NC    FAMILY HISTORY:  No pertinent family history in first degree relatives      REVIEW OF SYSTEMS:    CONSTITUTIONAL: No weakness, fevers or chills  EYES/ENT: No visual changes;  No vertigo or throat pain   NECK: No pain or stiffness  RESPIRATORY: No cough, wheezing, hemoptysis; No shortness of breath  CARDIOVASCULAR: No chest pain or palpitations  GENITOURINARY: No dysuria, frequency or hematuria  NEUROLOGICAL: No numbness or weakness  SKIN: No itching, burning, rashes, or lesions   All other review of systems is negative unless indicated above.    Vital Signs Last 24 Hrs  T(C): 36.8 (11 Apr 2018 12:00), Max: 37.4 (11 Apr 2018 06:21)  T(F): 98.2 (11 Apr 2018 12:00), Max: 99.4 (11 Apr 2018 06:21)  HR: 91 (11 Apr 2018 12:01) (82 - 96)  BP: 119/70 (11 Apr 2018 12:01) (85/72 - 128/59)  BP(mean): 83 (11 Apr 2018 12:01) (62 - 83)  RR: 14 (11 Apr 2018 12:01) (14 - 21)  SpO2: 99% (11 Apr 2018 12:01) (90% - 99%)    PHYSICAL EXAM:    Constitutional: NAD, well-developed  HEENT: EOMI, throat clear  Neck: No LAD, supple  Respiratory: CTA and P  Cardiovascular: S1 and S2, RRR, no M  Gastrointestinal: BS+, soft, mild epig tend/ND, neg HSM,  Extremities: No peripheral edema, neg clubing, cyanosis  Vascular: 2+ peripheral pulses  Neurological: A/O x 3, no focal deficits  Psychiatric: Normal mood, normal affect  Skin: No rashes    LABS:  CBC Full  -  ( 10 Apr 2018 09:14 )  WBC Count : 22.84 K/uL  Hemoglobin : 13.4 g/dL  Hematocrit : 36.9 %  Platelet Count - Automated : 191 K/uL  Mean Cell Volume : 83.3 fl  Mean Cell Hemoglobin : 30.2 pg  Mean Cell Hemoglobin Concentration : 36.3 gm/dL  Auto Neutrophil # : 18.17 K/uL  Auto Lymphocyte # : 3.24 K/uL  Auto Monocyte # : 1.13 K/uL  Auto Eosinophil # : 0.03 K/uL  Auto Basophil # : 0.07 K/uL  Auto Neutrophil % : 79.6 %  Auto Lymphocyte % : 14.2 %  Auto Monocyte % : 4.9 %  Auto Eosinophil % : 0.1 %  Auto Basophil % : 0.3 %    04-11    132<L>  |  99  |  8   ----------------------------<  182<H>  3.7   |  24  |  0.69    Ca    8.2<L>      11 Apr 2018 05:03  Phos  2.4     04-11  Mg     1.9     04-11    TPro  6.7  /  Alb  3.0<L>  /  TBili  0.7  /  DBili  x   /  AST  41<H>  /  ALT  58  /  AlkPhos  62  04-11            RADIOLOGY & ADDITIONAL STUDIES:  < from: US Abdomen Complete (04.10.18 @ 11:44) >  EXAM:  US COMPLETE ABDOMEN SONOGRAM                            PROCEDURE DATE:  04/10/2018          INTERPRETATION:  US COMPLETE ABDOMEN SONOGRAM    HISTORY:  Bile duct dilatation on CT    COMPARISON: Same day CT abdomen and pelvis    Multiple transverse and longitudinal sonographic images of the abdomen   were obtained.    LIVER: 19 cm in length. The liver is diffusely increased in echogenicity,   consistent with fatty infiltration. No focal lesions are identified.   Ultrasound sensitivity is somewhat limited in this setting. Main portal   vein is patent with normal flow direction.    SPLEEN:  10.8 cm in length.     GALLBLADDER: Status post cholecystectomy.        BILE DUCTS: The common bile duct measures 12 mm.  No intrahepatic ductal   dilatation.    PANCREAS:  The head and proximal body are normal. The distal body and   tail are obscured.    RIGHT KIDNEY: 12 cm in length.  No hydronephrosis or shadowing stone.    LEFT KIDNEY:  12.5 cm in length.  No hydronephrosis or shadowing stone.    AORTA AND INFERIOR VENA CAVA: Normal in caliber.        FLUID: No ascites.    IMPRESSION:    Hepatomegaly and diffuse hepatic steatosis.    12 mm common bile duct likely related to postcholecystectomy state.        < end of copied text >
Patient is a 67y old  Female who presents with a chief complaint of AMS (10 Apr 2018 09:08)      HPI:  68 y/o female in ED found at bottom of stairs by family with AMS.  pt confused with no complaints.   visible ecchymosis to face and abd.patient was cleared by trauma team in ED - Had CT head/C spine /abd/pelvis/chest -no acute trauma .As per juan luis bowman to whom i spoke over the phone .patient at baseline is very coherent. At this time patient is oriented to self alone and does not follow all single step commands. patient is awake .  denies any pain  Patient was found to have low Na, low bicarb  with elevated anion gap and small ketones in urine , elevated WBC  no fever  patient was given IVF bolus x1 and now on IVF NS 125cc./hr started in ED    pmhx- DM on metformin ,HLD  per juan luis does not know more hx  pshx- hysterectomy and choecystectomy per juan luis  family hx -non contributory  social hx- patient lives alone, no smoking or alcohol use per juan luis (10 Apr 2018 09:08)    pt recalls that her colonoscopy was doen wit Dr LATISHA Kendall  I discussed the case with him and that he would likely to continue to follow up for the duration of the hospital visit.  Patient denies any abdominal pain, alert and oriented. She had some mild reflux but this appears to have improved. This worsened and she was laying down. She denies any chest pain.  Denies any recreational drug use at home.        PAST MEDICAL & SURGICAL HISTORY:      MEDICATIONS  (STANDING):  dextrose 5%. 1000 milliLiter(s) (50 mL/Hr) IV Continuous <Continuous>  dextrose 50% Injectable 12.5 Gram(s) IV Push once  dextrose 50% Injectable 25 Gram(s) IV Push once  dextrose 50% Injectable 25 Gram(s) IV Push once  enoxaparin Injectable 40 milliGRAM(s) SubCutaneous every 24 hours  insulin lispro (HumaLOG) corrective regimen sliding scale   SubCutaneous every 6 hours  regadenoson Injectable 0.4 milliGRAM(s) IV Push once  regadenoson Injectable 0.4 milliGRAM(s) IV Push once  sodium chloride 0.45% 1000 milliLiter(s) (60 mL/Hr) IV Continuous <Continuous>    MEDICATIONS  (PRN):  acetaminophen   Tablet. 650 milliGRAM(s) Oral every 6 hours PRN Moderate Pain (4 - 6)  dextrose Gel 1 Dose(s) Oral once PRN Blood Glucose LESS THAN 70 milliGRAM(s)/deciliter  glucagon  Injectable 1 milliGRAM(s) IntraMuscular once PRN Glucose LESS THAN 70 milligrams/deciliter      Allergies    penicillin (Urticaria)    Intolerances        SOCIAL HISTORY:NC    FAMILY HISTORY:  No pertinent family history in first degree relatives      REVIEW OF SYSTEMS:    CONSTITUTIONAL: No weakness, fevers or chills  EYES/ENT: No visual changes;  No vertigo or throat pain   NECK: No pain or stiffness  RESPIRATORY: No cough, wheezing, hemoptysis; No shortness of breath  CARDIOVASCULAR: No chest pain or palpitations  GENITOURINARY: No dysuria, frequency or hematuria  NEUROLOGICAL: No numbness or weakness  SKIN: No itching, burning, rashes, or lesions   All other review of systems is negative unless indicated above.    Vital Signs Last 24 Hrs  T(C): 36.8 (12 Apr 2018 05:00), Max: 37.1 (11 Apr 2018 23:59)  T(F): 98.3 (12 Apr 2018 05:00), Max: 98.8 (11 Apr 2018 23:59)  HR: 87 (12 Apr 2018 05:00) (87 - 106)  BP: 126/69 (12 Apr 2018 05:00) (115/58 - 153/88)  BP(mean): 89 (11 Apr 2018 19:00) (68 - 89)  RR: 18 (12 Apr 2018 05:00) (14 - 23)  SpO2: 96% (12 Apr 2018 05:00) (95% - 99%)    PHYSICAL EXAM:    Constitutional: NAD, well-developed  HEENT: EOMI, throat clear  Neck: No LAD, supple  Respiratory: CTA and P  Cardiovascular: S1 and S2, RRR, no M  Gastrointestinal: BS+, soft, NT/ND, neg HSM,  Extremities: No peripheral edema, neg clubing, cyanosis  Vascular: 2+ peripheral pulses  Neurological: A/O x 3, no focal deficits  Psychiatric: Normal mood, normal affect  Skin: No rashes    LABS:  CBC Full  -  ( 12 Apr 2018 05:52 )  WBC Count : 9.38 K/uL  Hemoglobin : 12.4 g/dL  Hematocrit : 35.8 %  Platelet Count - Automated : 162 K/uL  Mean Cell Volume : 87.5 fl  Mean Cell Hemoglobin : 30.3 pg  Mean Cell Hemoglobin Concentration : 34.6 gm/dL  Auto Neutrophil # : 5.27 K/uL  Auto Lymphocyte # : 3.09 K/uL  Auto Monocyte # : 0.70 K/uL  Auto Eosinophil # : 0.22 K/uL  Auto Basophil # : 0.05 K/uL  Auto Neutrophil % : 56.3 %  Auto Lymphocyte % : 32.9 %  Auto Monocyte % : 7.5 %  Auto Eosinophil % : 2.3 %  Auto Basophil % : 0.5 %    04-12    141  |  106  |  10  ----------------------------<  133<H>  3.4<L>   |  24  |  0.74    Ca    8.5      12 Apr 2018 05:52  Phos  2.0     04-12  Mg     2.0     04-12    TPro  6.5  /  Alb  3.0<L>  /  TBili  0.5  /  DBili  x   /  AST  60<H>  /  ALT  57  /  AlkPhos  53  04-12            RADIOLOGY & ADDITIONAL STUDIES:  < from: US Abdomen Complete (04.10.18 @ 11:44) >  EXAM:  US COMPLETE ABDOMEN SONOGRAM                            PROCEDURE DATE:  04/10/2018          INTERPRETATION:  US COMPLETE ABDOMEN SONOGRAM    HISTORY:  Bile duct dilatation on CT    COMPARISON: Same day CT abdomen and pelvis    Multiple transverse and longitudinal sonographic images of the abdomen   were obtained.    LIVER: 19 cm in length. The liver is diffusely increased in echogenicity,   consistent with fatty infiltration. No focal lesions are identified.   Ultrasound sensitivity is somewhat limited in this setting. Main portal   vein is patent with normal flow direction.    SPLEEN:  10.8 cm in length.     GALLBLADDER: Status post cholecystectomy.        BILE DUCTS: The common bile duct measures 12 mm.  No intrahepatic ductal   dilatation.    PANCREAS:  The head and proximal body are normal. The distal body and   tail are obscured.    RIGHT KIDNEY: 12 cm in length.  No hydronephrosis or shadowing stone.    LEFT KIDNEY:  12.5 cm in length.  No hydronephrosis or shadowing stone.    AORTA AND INFERIOR VENA CAVA: Normal in caliber.        FLUID: No ascites.    IMPRESSION:    Hepatomegaly and diffuse hepatic steatosis.    12 mm common bile duct likely related to postcholecystectomy state.          < end of copied text >
Patient is a 67y old  Female who presents with a chief complaint of AMS (10 Apr 2018 09:08)    68 y/o female in ED found at bottom of stairs by family with AMS.  pt confused with no complaints.   visible ecchymosis to face and abd.patient was cleared by trauma team in ED - Had CT head/C spine /abd/pelvis/chest -no acute trauma .As per juan luis bowman to whom i spoke over the phone .patient at baseline is very coherent. At this time patient is oriented to self alone and does not follow all single step commands. patient is awake .  denies any pain  Patient was found to have low Na, low bicarb  with elevated anion gap and small ketones in urine , elevated WBC  no fever  patient was given IVF bolus x1 and now on IVF NS 125cc./hr started in ED    4/10-[facility] over [facility] and started on antibiotics for a respiratory infection.   Has noticed blood sugars have been high and low.   remembers blood sugar high yesterday and took medication. remembers falling and was found this morning by the land lord.   slow thinking. Niece says this isn't her baseline.   denies chest pain or unusual shortness of breath. has asthma related sob.     4/11-  awake, alert and appropriate today.  electrolytes improved.   denies chest pain or shortness of breath.   doesn't remember the fall or what happened afterwards    Allergies    Allergy Status Unknown    Intolerances        MEDICATIONS  (STANDING):  dextrose 5%. 1000 milliLiter(s) (50 mL/Hr) IV Continuous <Continuous>  dextrose 50% Injectable 12.5 Gram(s) IV Push once  dextrose 50% Injectable 25 Gram(s) IV Push once  dextrose 50% Injectable 25 Gram(s) IV Push once  enoxaparin Injectable 40 milliGRAM(s) SubCutaneous every 24 hours  insulin lispro (HumaLOG) corrective regimen sliding scale   SubCutaneous every 6 hours  piperacillin/tazobactam IVPB. 3.375 Gram(s) IV Intermittent every 8 hours  regadenoson Injectable 0.4 milliGRAM(s) IV Push once  sodium chloride 0.45% 1000 milliLiter(s) (60 mL/Hr) IV Continuous <Continuous>    MEDICATIONS  (PRN):  acetaminophen   Tablet. 650 milliGRAM(s) Oral every 6 hours PRN Moderate Pain (4 - 6)  dextrose Gel 1 Dose(s) Oral once PRN Blood Glucose LESS THAN 70 milliGRAM(s)/deciliter  glucagon  Injectable 1 milliGRAM(s) IntraMuscular once PRN Glucose LESS THAN 70 milligrams/deciliter    REVIEW OF SYSTEMS:    RESPIRATORY: No cough, wheezing, hemoptysis; No shortness of breath  CARDIOVASCULAR: No chest pain or palpitations  All other review of systems is negative unless indicated above      PHYSICAL EXAM:  Daily     Daily   Vital Signs Last 24 Hrs  T(C): 37.4 (11 Apr 2018 06:21), Max: 37.4 (11 Apr 2018 06:21)  T(F): 99.4 (11 Apr 2018 06:21), Max: 99.4 (11 Apr 2018 06:21)  HR: 89 (11 Apr 2018 06:00) (82 - 97)  BP: 89/63 (11 Apr 2018 06:00) (89/63 - 131/89)  BP(mean): 69 (11 Apr 2018 06:00) (67 - 80)  RR: 14 (11 Apr 2018 06:00) (14 - 21)  SpO2: 95% (11 Apr 2018 06:00) (90% - 100%)    Constitutional: NAD, awake and alert, well-developed  HEENT: PERR, EOMI, Normal Hearing, MMM  Neck: Soft and supple, No LAD, No JVD  Respiratory: Breath sounds are clear bilaterally, No wheezing, rales or rhonchi  Cardiovascular: S1 and S2, regular rate and rhythm, no Murmurs, gallops or rubs  Gastrointestinal: Bowel Sounds present, soft, nontender, nondistended, no guarding, no rebound  Extremities: No peripheral edema  Vascular: 2+ peripheral pulses  Neurological: A/O x 3, no focal deficits  Musculoskeletal: 5/5 strength b/l upper and lower extremities  Skin: No rashes    LABS: All Labs Reviewed:                        13.4   22.84 )-----------( 191      ( 10 Apr 2018 09:14 )             36.9     04-11    132<L>  |  99  |  8   ----------------------------<  182<H>  3.7   |  24  |  0.69    Ca    8.2<L>      11 Apr 2018 05:03  Phos  2.4     04-11  Mg     1.9     04-11    TPro  6.7  /  Alb  3.0<L>  /  TBili  0.7  /  DBili  x   /  AST  41<H>  /  ALT  58  /  AlkPhos  62  04-11      CARDIAC MARKERS ( 10 Apr 2018 12:44 )  0.147 ng/mL / x     / x     / x     / x              TELEMETRY/EKG: NSR    Echo:  < from: Transthoracic Echocardiogram (04.10.18 @ 11:15) >   EXAM:  2D ECHOCARDIOGRAM AD         PROCEDURE DATE:  04/10/2018        INTERPRETATION:  Transthoracic Echocardiography Report (TTE)     Demographics     Patient name       ARVIND TOLEDO      Age           67 year(s)     Med Rec #          [id number]          Gender        Female     Account #          [id number]            Date of Birth 1950     Interpreting       Toño Larson DO     Room Number   0004   Physician     Referring          Agustin Garcia Sonographer   Physician SANDI Loya                             [facility]     Date of study      04/10/2018 11:04                      AM     Height             62.01 in           Weight        199.96 pounds    Type of Study:     TTE procedure: 2D echocardiogram     Study Location: EDTechnical Quality: Technically Difficullt    Indications   1) R55 - Syncope and collapse    M-Mode Measurements (cm)     LVEDd: 5.01 cm              LVESd: 3.5 cm   IVSEd: 1.1 cm   LVPWd: 1.11 cm              AO Root Dimension: 3 cm                             ACS: 1.8 cm                               LA: 3.7 cm    Doppler Measurements:     AV Velocity:144 cm/s   AV Peak Gradient: 8.29 mmHg     TR Velocity:191 cm/s   TR Gradient:14.5924 mmHg   Estimated RAP:5 mmHg   RVSP:20 mmHg     Findings     Mitral Valve   Normal appearing mitral valve structure and function.   Trace to mild mitral regurgitation is present.     Aortic Valve   Normal aortic valve structure and function.   No aortic regurgitation is present.     Tricuspid Valve   Normal appearing tricuspid valve structure and function.   Trace tricuspid valve regurgitation is present.     Pulmonic Valve   Pulmonic valve not well seen, probably normal pulmonic valve function.     Left Atrium   The left atrium is normal.     Left Ventricle   The left ventricle is normal in size, wall thickness, wall motion and   contractility.   Estimated left ventricular ejection fraction is 55-60%.     Right Atrium   Normal appearing right atrium.     Right Ventricle   Normal appearing right ventricle structure and function.     Pericardial Effusion   No evidence of pericardial effusion.     Pleural Effusion   No evidence of pleural effusion.     Impression     Summary     The left ventricle is normal in size, wall thickness, wall motion and   contractility.   Estimated left ventricular ejection fraction is 55-60%.   Normal appearing right ventricle structure and function.     Trace to mild mitral regurgitation is present.   Trace tricuspid valve regurgitation is present.     No evidence of pericardial effusion.     Signature     ----------------------------------------------------------------   Electronically signed by Toño Larson DO(Interpreting   physician) on 04/10/2018 02:22 PM    < end of copied text >
Patient is a 67y old  Female who presents with a chief complaint of AMS (10 Apr 2018 09:08)    Date of service: 04-11-18 @ 14:31  Patient more alert, was unsteady on her feet when walking to commode, afebrile  ROS: no fever or chills; denies dizziness, no HA, no SOB or cough, no abdominal pain, no diarrhea or constipation; no dysuria, no urinary frequency, no legs pain, no rashes    MEDICATIONS  (STANDING):  dextrose 5%. 1000 milliLiter(s) (50 mL/Hr) IV Continuous <Continuous>  dextrose 50% Injectable 12.5 Gram(s) IV Push once  dextrose 50% Injectable 25 Gram(s) IV Push once  dextrose 50% Injectable 25 Gram(s) IV Push once  enoxaparin Injectable 40 milliGRAM(s) SubCutaneous every 24 hours  insulin lispro (HumaLOG) corrective regimen sliding scale   SubCutaneous every 6 hours  piperacillin/tazobactam IVPB. 3.375 Gram(s) IV Intermittent every 8 hours  regadenoson Injectable 0.4 milliGRAM(s) IV Push once  sodium chloride 0.45% 1000 milliLiter(s) (60 mL/Hr) IV Continuous <Continuous>    MEDICATIONS  (PRN):  acetaminophen   Tablet. 650 milliGRAM(s) Oral every 6 hours PRN Moderate Pain (4 - 6)  dextrose Gel 1 Dose(s) Oral once PRN Blood Glucose LESS THAN 70 milliGRAM(s)/deciliter  glucagon  Injectable 1 milliGRAM(s) IntraMuscular once PRN Glucose LESS THAN 70 milligrams/deciliter      Vital Signs Last 24 Hrs  T(C): 36.8 (11 Apr 2018 12:00), Max: 37.4 (11 Apr 2018 06:21)  T(F): 98.2 (11 Apr 2018 12:00), Max: 99.4 (11 Apr 2018 06:21)  HR: 91 (11 Apr 2018 12:01) (82 - 96)  BP: 119/70 (11 Apr 2018 12:01) (85/72 - 128/59)  BP(mean): 83 (11 Apr 2018 12:01) (62 - 83)  RR: 14 (11 Apr 2018 12:01) (14 - 21)  SpO2: 99% (11 Apr 2018 12:01) (90% - 99%)    Physical Exam:        Daily   Constitutional: frail looking  HEENT: NC, right cheek with erythyema, EOMI, PERRLA  Neck: supple, full range of motion, no lad  Respiratory: clear, no r/r/w  Cardiovascular: S1S2 regular, no murmurs  Abdomen: soft, not tender, not distended, positive BS  Musculoskeletal: no muscle tenderness, no joint swelling or tenderness  Neurological: AxOx3, moving all extremities, no focal deficits, 5/5 motor strength  Skin: no rashes             Labs:                        13.4   22.84 )-----------( 191      ( 10 Apr 2018 09:14 )             36.9     04-11    132<L>  |  99  |  8   ----------------------------<  182<H>  3.7   |  24  |  0.69    Ca    8.2<L>      11 Apr 2018 05:03  Phos  2.4     04-11  Mg     1.9     04-11    TPro  6.7  /  Alb  3.0<L>  /  TBili  0.7  /  DBili  x   /  AST  41<H>  /  ALT  58  /  AlkPhos  62  04-11           Cultures:             Radiology:< from: CT Abdomen and Pelvis w/ IV Cont (04.10.18 @ 05:35) >    EXAM:  CT ABDOMEN AND PELVIS IC                          EXAM:  CT CHEST IC                            PROCEDURE DATE:  04/10/2018          INTERPRETATION:  CT CHEST, ABDOMEN, AND PELVIS DATED 4/10/2018.    CLINICAL INFORMATION:  Status post fall with change in mental status,   found on floor..    TECHNIQUE:  Axial CT images through the chest and abdomen are obtained   during arterial phase.  Thereafter, delayed venous phase images through   the abdomen and pelvis are acquired. Images are reformatted in the   sagittal and coronal planes. Maximum intensity projection images are   obtained. 95cc of Omnipaque 350 were administered without event.  5cc   were discarded.    No prior studies are available for comparison.    FINDINGS:    The thoracic and abdominal aorta are normal in caliber and contour.    There is no mediastinal or periaortic hematoma.  There is no evidence of   acute traumatic aortic injury.      The heart is borderline enlarged. There is no pericardial effusion.    There isno focal lung consolidation, suspicious nodule, or mass. There   is bibasilar dependent atelectasis. The central airways are patent. There   is no pleural effusion, hemothorax, or pneumothorax.     There is no significant supraclavicular, axillary, mediastinal, or hilar   lymphadenopathy.    The visualized thyroid gland is unremarkable in appearance.    There is diffuse hepatic steatosis. Cholecystectomy clips are present.   Common bile duct is minimally dilated measuring up to 7-8 mm with distal  tapering. The pancreas, spleen, adrenal glands, and kidneys are   unremarkable. The urinary bladder is unremarkable in appearance. The   uterus is absent. The adnexa are unremarkable.    There is a small hiatus hernia. There is no bowel obstructionor overt   bowel wall thickening. A normal appendix is identified. There are a few   colonic diverticula without surrounding inflammatory change. There is no   pneumoperitoneum, hemoperitoneum, or ascites. There is no evidence of a   mesenteric hematoma.    There is no significant abdominal or pelvic lymphadenopathy.    There is a tiny fat-containing umbilical hernia.    Evaluation for subtle rib fractures is limited given motion degradation.   There is no gross acute osseous fracture. There are degenerative changes   of the spine.    < end of copied text >      Advanced directive addressed: full resuscitation

## 2018-04-13 NOTE — DISCHARGE NOTE ADULT - CARE PROVIDER_API CALL
Jacob Casas), Gastroenterology; Internal Medicine  [facility]  [address] [place] [address]  Phone: [phone]  Fax: [phone]    Dr Ad fried- PCP at [facility]  Phone: (   )    -  Fax: (   )    -    Zack Riddle), Internal Medicine; Nephrology  [address] [place] [address]  Phone: [phone]  Fax: [phone]

## 2018-04-13 NOTE — DISCHARGE NOTE ADULT - HOSPITAL COURSE
66 y/o female in ED found at bottom of stairs by family with AMS.  pt confused with no complaints.   visible ecchymosis to face and abd.patient was cleared by trauma team in ED - Had CT head/C spine /abd/pelvis/chest -no acute trauma .As per juan luis bowman to whom i spoke over the phone .patient at baseline is very coherent. At this time patient is oriented to self alone and does not follow all single step commands. patient is awake .  denies any pain  Patient was found to have low Na, low bicarb  with elevated anion gap and small ketones in urine , elevated WBC  no fever  patient was given IVF bolus x1 and now on IVF NS 125cc./hr started in ED  4/11- patient alert and awake , able to take po ,no comaplinst  4/12- IV abx discontinued today, mentation back to baseline, patient to go for MRCP today ,if wnl then d/c patient after PT eval     pmhx- DM on metformin ,HLD  per juan luis does not know more hx  pshx- hysterectomy and choecystectomy per juan luis  family hx -non contributory  social hx- patient lives alone, no smoking or alcohol use per juan luisConstitutional: NAD ,sleeping ,but easily arousable   	HEENT: Atraumatic, EDGAR, Normal, No congestion  	Respiratory: Breath Sounds normal, no rhonchi/wheeze  	Cardiovascular: N S1S2; RANDALL present  	Gastrointestinal: Abdomen soft, non tender, Bowel Ssounds present  	Extremities: No edema, peripheral pulses present  no focal neuro deficits grossly  	Skin: Non cellulitic,66 y/o female in ED found at bottom of stairs by family with AMS  # Found on Floor /with AMS- syncope w/u negative   #Acute toxic metabolic encephalopathy- from electrolyte derrangement from hyponatremia from dehydration   # Hyponatremia /secondary to volume depletion from   # s/p respiratory alkalosis could be due to hyponatremia , dehydration - doubt PE (pulse ox 100% on RA), CT chest- no CHF or PNA  # elevated anion gap  now resolved  - likely from elevated lactate from dehydration- DKA excluded /sepsis w/u neg  # leucocytosis could be reactive/ dehydration-sepsis ruled out, WBC wnl after IV hydration  #CBD dilation without abd pain ,ALT mild elevation- could be secondary to post surgical chnages from hx cholecystectomy-MRCP negative CBD 8mm  #Hypokalemia    Plan  s/p empiric  IV zosyn which was discontinued 4/12, blood cx neg,,urine cx neg - sepsis w/u neg- ID consult appreciated   hyponatremia improved with IVF, after 10 hrs stop IVF per renal, lytes repleted per renal- renal consult appreciated   MRCP to evaluate CBD dilation- GI consult appreciated    encephalopathy improved after conrrection of electrolytessyncope w/u negative , tele no alarms, echo wnl, EKG no arhythmia,  nuclear stress test neg- no further cardio testing advised -cardio consult appreciated   Nephro, GI and cardio  and ID consult appreciated    needs outpatient renal f/u if recurrent hyponatremia, check BMP as outpatient in 1 week with PMD  GI outpatient appt for routine screening when due   PT eval -for possible d/c 4/12  DVT prophylaxis- lovenox SC 68 y/o female in ED found at bottom of stairs by family with AMS.  pt confused with no complaints.   visible ecchymosis to face and abd.patient was cleared by trauma team in ED - Had CT head/C spine /abd/pelvis/chest -no acute trauma .As per juan luis bowman to whom i spoke over the phone .patient at baseline is very coherent. At this time patient is oriented to self alone and does not follow all single step commands. patient is awake .  denies any pain  Patient was found to have low Na, low bicarb  with elevated anion gap and small ketones in urine , elevated WBC  no fever  patient was given IVF bolus x1 and now on IVF NS 125cc./hr started in ED  4/11- patient alert and awake , able to take po ,no comaplinst  4/12- IV abx discontinued today, mentation back to baseline, patient to go for MRCP today ,if wnl then d/c patient after PT eval     pmhx- DM on metformin ,HLD  per juan luis does not know more hx  pshx- hysterectomy and choecystectomy per juan luis  family hx -non contributory  social hx- patient lives alone, no smoking or alcohol use per juan luisConstitutional: NAD ,sleeping ,but easily arousable   	HEENT: Atraumatic, EDGAR, Normal, No congestion  	Respiratory: Breath Sounds normal, no rhonchi/wheeze  	Cardiovascular: N S1S2; RANDALL present  	Gastrointestinal: Abdomen soft, non tender, Bowel Ssounds present  	Extremities: No edema, peripheral pulses present  no focal neuro deficits grossly  	Skin: Non cellulitic,68 y/o female in ED found at bottom of stairs by family with AMS  # Found on Floor /with AMS- syncope w/u negative   #Acute toxic metabolic encephalopathy- from electrolyte derrangement from hyponatremia from dehydration   # Hyponatremia /secondary to volume depletion from   # s/p respiratory alkalosis could be due to hyponatremia , dehydration - doubt PE (pulse ox 100% on RA), CT chest- no CHF or PNA  # elevated anion gap  now resolved  - likely from elevated lactate from dehydration- DKA excluded /sepsis w/u neg  # leucocytosis could be reactive/ dehydration-sepsis ruled out, WBC wnl after IV hydration  #CBD dilation without abd pain ,ALT mild elevation- could be secondary to post surgical chnages from hx cholecystectomy-MRCP negative CBD 8mm  #Hypokalemia    Plan  s/p empiric  IV zosyn which was discontinued 4/12, blood cx neg,,urine cx neg - sepsis w/u neg- ID consult appreciated   hyponatremia improved with IVF, after 10 hrs stop IVF per renal, lytes repleted per renal- renal consult appreciated   MRCP to evaluate CBD dilation- GI consult appreciated    encephalopathy improved after conrrection of electrolytessyncope w/u negative , tele no alarms, echo wnl, EKG no arhythmia,  nuclear stress test neg- no further cardio testing advised -cardio consult appreciated   Nephro, GI and cardio  and ID consult appreciated    needs outpatient renal f/u if recurrent hyponatremia, check BMP as outpatient in 1 week with PMD  GI outpatient appt for routine screening when due     discussed with patient and her HCP juan luis  and RN and team  75mins for discharge and coordination of care

## 2018-04-13 NOTE — DISCHARGE NOTE ADULT - MEDICATION SUMMARY - MEDICATIONS TO STOP TAKING
I will STOP taking the medications listed below when I get home from the hospital:    metFORMIN 500 mg oral tablet    zolpidem 10 mg oral tablet  -- 1 tab(s) by mouth once a day (at bedtime)

## 2018-04-13 NOTE — PROGRESS NOTE ADULT - PROVIDER SPECIALTY LIST ADULT
Cardiology
Gastroenterology
Gastroenterology
Hospitalist
Hospitalist
Infectious Disease
Nephrology
Nephrology
Gastroenterology
Infectious Disease

## 2018-04-13 NOTE — DISCHARGE NOTE ADULT - PATIENT PORTAL LINK FT
You can access the [facility] Patient Portal, offered by [facility], by registering with the following website: [email or web address]

## 2018-04-13 NOTE — DISCHARGE NOTE ADULT - PLAN OF CARE
see below f/u PMD for BMP in 1 week (dr corky moe), f/u Dr Pandya GI for routine screening, advised to f/u  nephrology if recurrent hyponatremia

## 2018-04-13 NOTE — DISCHARGE NOTE ADULT - PROVIDER TOKENS
TOKEN:'[id number]',FREE:[LAST:[corky],PHONE:[(   )    -],FAX:[(   )    -],ADDRESS:[Dr Duong- PCP at [facility]]],TOKEN:'[id number]'

## 2018-04-13 NOTE — DISCHARGE NOTE ADULT - CARE PROVIDERS DIRECT ADDRESSES
[email or web address]

## 2018-04-13 NOTE — PROGRESS NOTE ADULT - ASSESSMENT
68 y/o female with past medical history diabetes, hyperlipidemia, asthma, s/p cholecystectomy and hysterectomy admitted one 4/10 for evaluation of change in mental status after being found at bottom of stairs; patient is arousable, oriented to person, place, remembers events intermittently, stating she had a cough and saw a urgicenter for bronchitis prior to admission, was given an unknown antibiotic, did have vomiting earlier. No other specific details available, history per medical record and niece at bedside.  1. Patient admitted with change in mental status, metabolic derangements on labs as well as leukocytosis  - antibiotics were stopped on 4/12; okay from id standpoint to discharge home on no antibiotics  2. other issues; diabetes, hyperlipidemia, asthma  - per medicine
Acute encephalopathy and leukocytosis improved    Possibly associated with steroids and hyperglycemia.    CBD dilation to 12 mm, atypical for postcholecystectomy. MRCP neg    Iwillsign off  Julisa Casas, primary gi  t to FU with him as out pt, few weeks after DC
66 y/o female in ED found at bottom of stairs by family with AMS  # Found on Floor /with AMS- Cannot exclude syncope  #Acute toxic metabolic encephalopathy- multifactorial r/o sepsis/hyponatremia  # Hyponatremia /secondary to volume depletion from   # compensated respiratory alkalosis could be due to hyponatremia and ?sepsis, dehydration - doubt PE (pulse ox 100% on RA), CT chest- no CHF or PNA  # elevated anion gap  now resolved  - likely from elevated lactate- DKA excluded   # leucocytosis could be reactive/ dehydration- r/o sepsis  #CBD dilation without abd pain ,ALT mild elevation- could be secondary to post surgical chnages from hx cholecystectomy-  #Hypokalemia    Plan   Admt to tele  on IV zosyn, f/u blood cx ,urine cx- ID consult appreciated   hyponatremia improved with IVF, after 10 hrs stop IVF per renal, lytes repleted per renal- renal consult appreciated   MRCP to evaluate CBD dilation- GI consult appreciated    encephalopathy improved after conrrection of electrolytes  syncope w/u done, tele no alarms, echo wnl, EKG no arhythmia, if nuclear stress test neg- no further cardio testing advised -cardio consult appreciated           abd US to evaluate CBD dialation and CBD stone  and lipase   Nephro  and ID consult  cardio consult r/o cardiogenic syncope  DVT prophylaxis- lovenox SC
66 y/o female in ED found at bottom of stairs by family with AMS  # Found on Floor /with AMS- syncope w/u negative   #Acute toxic metabolic encephalopathy- from electrolyte derrangement from hyponatremia from dehydration   # Hyponatremia /secondary to volume depletion from   # s/p respiratory alkalosis could be due to hyponatremia , dehydration - doubt PE (pulse ox 100% on RA), CT chest- no CHF or PNA  # elevated anion gap  now resolved  - likely from elevated lactate from dehydration- DKA excluded /sepsis w/u neg  # leucocytosis could be reactive/ dehydration-sepsis ruled out, WBC wnl after IV hydration  #CBD dilation without abd pain ,ALT mild elevation- could be secondary to post surgical chnages from hx cholecystectomy-  #Hypokalemia    Plan  s/p empiric  IV zosyn which was discontinued 4/12, blood cx neg,,urine cx neg - sepsis w/u neg- ID consult appreciated   hyponatremia improved with IVF, after 10 hrs stop IVF per renal, lytes repleted per renal- renal consult appreciated   MRCP to evaluate CBD dilation- GI consult appreciated    encephalopathy improved after conrrection of electrolytes  syncope w/u negative , tele no alarms, echo wnl, EKG no arhythmia,  nuclear stress test neg- no further cardio testing advised -cardio consult appreciated   Nephro, GI and cardio  and ID consult appreciated   PT eval -for possible d/c 4/12  DVT prophylaxis- lovenox SC
66 y/o female with past medical history diabetes, hyperlipidemia, asthma, s/p cholecystectomy and hysterectomy admitted one 4/10 for evaluation of change in mental status after being found at bottom of stairs; patient is arousable, oriented to person, place, remembers events intermittently, stating she had a cough and saw a urgicenter for bronchitis prior to admission, was given an unknown antibiotic, did have vomiting earlier. No other specific details available, history per medical record and niece at bedside.  1. Patient admitted with change in mental status, metabolic derangements on labs as well as leukocytosis  - unclear if this is picture of sepsis  - follow up cultures   - iv hydration and supportive care   - serial cbc and monitor temperature   - day #2 zosyn  - tolerating antibiotics without rashes or side effects   2. other issues; diabetes, hyperlipidemia, asthma  - per medicine
67y Female whom presented to the hospital with history diabetes, hyperlipidemia, asthma, s/p cholecystectomy and hysterectomy admitted on 4/10 for change in mental status., renal evaluation of hyponatremia/electrolyte abnormalieites. Response to IVF, multiple lyte abnormalities and her limited history of GI loss makes pre-renal/hypovolemic state more likely.     Hyponatremia  -Na 123 (corrected on admit) to about 132 today with conversion to 1/2 NS overnight  -maintain low dose IVF while NPO. Stop this PM (auto stop in 10 hours placed)  -Suspect GI lossess, outpatient "bronchitis" led to pre-renal state/hypovoemic hyponatremia    Lytes  -K with IVF at 20/L as above  -K phos IV ordered  -Mg/phos repltion as needed    AMS/WBC  -ID/Cultures/MRCP  -Abx per medicine    d/c with Niece bedside  d/c with staff
67y Female whom presented to the hospital with history diabetes, hyperlipidemia, asthma, s/p cholecystectomy and hysterectomy admitted on 4/10 for change in mental status., renal evaluation of hyponatremia/electrolyte abnormalieites. Response to IVF, multiple lyte abnormalities and her limited history of GI loss makes pre-renal/hypovolemic state more likely.     Hyponatremia - correcting - prerenal /hypovolemia   -corrected while on  1/2 NS overnight from 132- 141 - now off IVF  -monitor Na trend    Lytes  - repletion in progress - K phos IV ordered  - monitor and Mg/phos repletion as needed    AMS/WBC  -ID/Cultures/MRCP  -Abx per medicine
68 y/o female in ED found at bottom of stairs by family with AMS.  pt confused with no complaints.   visible ecchymosis to face and abd.patient was cleared by trauma team in ED - Had CT head/C spine /abd/pelvis/chest -no acute trauma .As per juan luis bowman to whom i spoke over the phone .patient at baseline is very coherent. At this time patient is oriented to self alone and does not follow all single step commands. patient is awake .Patient was found to have low Na, low bicarb  with elevated anion gap and small ketones in urine , elevated WBC  no fever  Had been treated with antibiotics over the weekend for a respiratory infection.    4/10-  normal echo with good LV function.   normal EKG without rhythm abnormalities.   Needs IV Fluids and sodium and potassium replacement.   would monitor on telemetry for an additional day.   when lab work is improved, would suggest a lexiscan nuclear stress test.     4/11-  greatly improved today  have ordered a Lexiscan nuclear stress test. If negative, no further cardiac testing would be necessary.
Acute encephalopathy and leukocytosis improving.    Possibly associated with steroids and hyperglycemia.    CBD dilation to 12 mm, atypical for postcholecystectomy. Would perform MRCP. Discussed with patient and hospitalist.  Would advance diet.
Acute encephalopathy and leukocytosis improving.    Possibly associated with steroids and hyperglycemia.    CBD dilation to 12 mm, atypical for postcholecystectomy. Would perform MRCP. Discussed with patient and hospitalist.  Would advance diet.    consider DC Abx if ok with ID and cx neg

## 2018-04-13 NOTE — DISCHARGE NOTE ADULT - CARE PLAN
Principal Discharge DX:	Hyponatremia  Goal:	see below  Assessment and plan of treatment:	f/u PMD for BMP in 1 week (dr corky moe), f/u Dr Pandya GI for routine screening, advised to f/u  nephrology if recurrent hyponatremia

## 2018-04-15 LAB
CULTURE RESULTS: SIGNIFICANT CHANGE UP
CULTURE RESULTS: SIGNIFICANT CHANGE UP
SPECIMEN SOURCE: SIGNIFICANT CHANGE UP
SPECIMEN SOURCE: SIGNIFICANT CHANGE UP

## 2018-04-16 DIAGNOSIS — E87.3 ALKALOSIS: ICD-10-CM

## 2018-04-16 DIAGNOSIS — E78.5 HYPERLIPIDEMIA, UNSPECIFIED: ICD-10-CM

## 2018-04-16 DIAGNOSIS — E87.1 HYPO-OSMOLALITY AND HYPONATREMIA: ICD-10-CM

## 2018-04-16 DIAGNOSIS — D72.829 ELEVATED WHITE BLOOD CELL COUNT, UNSPECIFIED: ICD-10-CM

## 2018-04-16 DIAGNOSIS — J45.909 UNSPECIFIED ASTHMA, UNCOMPLICATED: ICD-10-CM

## 2018-04-16 DIAGNOSIS — R41.82 ALTERED MENTAL STATUS, UNSPECIFIED: ICD-10-CM

## 2018-04-16 DIAGNOSIS — E86.0 DEHYDRATION: ICD-10-CM

## 2018-04-16 DIAGNOSIS — G93.41 METABOLIC ENCEPHALOPATHY: ICD-10-CM

## 2018-04-16 DIAGNOSIS — K83.8 OTHER SPECIFIED DISEASES OF BILIARY TRACT: ICD-10-CM

## 2018-04-16 DIAGNOSIS — E87.6 HYPOKALEMIA: ICD-10-CM

## 2018-04-16 DIAGNOSIS — Z79.84 LONG TERM (CURRENT) USE OF ORAL HYPOGLYCEMIC DRUGS: ICD-10-CM

## 2018-04-16 DIAGNOSIS — E11.9 TYPE 2 DIABETES MELLITUS WITHOUT COMPLICATIONS: ICD-10-CM

## 2018-04-20 ENCOUNTER — TRANSCRIPTION ENCOUNTER (OUTPATIENT)
Age: 68
End: 2018-04-20

## 2018-04-20 ENCOUNTER — APPOINTMENT (OUTPATIENT)
Dept: OBGYN | Facility: CLINIC | Age: 68
End: 2018-04-20
Payer: COMMERCIAL

## 2018-04-20 VITALS — DIASTOLIC BLOOD PRESSURE: 80 MMHG | SYSTOLIC BLOOD PRESSURE: 130 MMHG

## 2018-04-20 DIAGNOSIS — B37.2 CANDIDIASIS OF SKIN AND NAIL: ICD-10-CM

## 2018-04-20 PROCEDURE — 99214 OFFICE O/P EST MOD 30 MIN: CPT

## 2018-04-23 LAB — HPV HIGH+LOW RISK DNA PNL CVX: NOT DETECTED

## 2018-04-26 LAB — CYTOLOGY CVX/VAG DOC THIN PREP: NORMAL

## 2018-12-03 ENCOUNTER — APPOINTMENT (OUTPATIENT)
Dept: OTOLARYNGOLOGY | Facility: CLINIC | Age: 68
End: 2018-12-03
Payer: COMMERCIAL

## 2018-12-03 VITALS
DIASTOLIC BLOOD PRESSURE: 85 MMHG | BODY MASS INDEX: 33.63 KG/M2 | WEIGHT: 197 LBS | SYSTOLIC BLOOD PRESSURE: 145 MMHG | HEART RATE: 93 BPM | HEIGHT: 64 IN

## 2018-12-03 DIAGNOSIS — E11.9 TYPE 2 DIABETES MELLITUS W/OUT COMPLICATIONS: ICD-10-CM

## 2018-12-03 DIAGNOSIS — I10 ESSENTIAL (PRIMARY) HYPERTENSION: ICD-10-CM

## 2018-12-03 DIAGNOSIS — K13.70 UNSPECIFIED LESIONS OF ORAL MUCOSA: ICD-10-CM

## 2018-12-03 DIAGNOSIS — Z83.3 FAMILY HISTORY OF DIABETES MELLITUS: ICD-10-CM

## 2018-12-03 DIAGNOSIS — Z87.09 PERSONAL HISTORY OF OTHER DISEASES OF THE RESPIRATORY SYSTEM: ICD-10-CM

## 2018-12-03 PROCEDURE — 99204 OFFICE O/P NEW MOD 45 MIN: CPT

## 2018-12-03 RX ORDER — IPRATROPIUM BROMIDE AND ALBUTEROL SULFATE 2.5; .5 MG/3ML; MG/3ML
0.5-2.5 (3) SOLUTION RESPIRATORY (INHALATION)
Refills: 0 | Status: ACTIVE | COMMUNITY

## 2018-12-03 RX ORDER — LOVASTATIN 20 MG/1
20 TABLET ORAL
Refills: 0 | Status: ACTIVE | COMMUNITY

## 2018-12-20 ENCOUNTER — FORM ENCOUNTER (OUTPATIENT)
Age: 68
End: 2018-12-20

## 2018-12-21 ENCOUNTER — APPOINTMENT (OUTPATIENT)
Dept: CT IMAGING | Facility: CLINIC | Age: 68
End: 2018-12-21
Payer: COMMERCIAL

## 2018-12-21 ENCOUNTER — OUTPATIENT (OUTPATIENT)
Dept: OUTPATIENT SERVICES | Facility: HOSPITAL | Age: 68
LOS: 1 days | End: 2018-12-21
Payer: COMMERCIAL

## 2018-12-21 DIAGNOSIS — Z00.8 ENCOUNTER FOR OTHER GENERAL EXAMINATION: ICD-10-CM

## 2018-12-21 PROCEDURE — 70490 CT SOFT TISSUE NECK W/O DYE: CPT

## 2018-12-21 PROCEDURE — 70490 CT SOFT TISSUE NECK W/O DYE: CPT | Mod: 26

## 2018-12-28 ENCOUNTER — APPOINTMENT (OUTPATIENT)
Dept: OTOLARYNGOLOGY | Facility: CLINIC | Age: 68
End: 2018-12-28
Payer: COMMERCIAL

## 2018-12-28 VITALS
WEIGHT: 197 LBS | DIASTOLIC BLOOD PRESSURE: 73 MMHG | HEART RATE: 78 BPM | HEIGHT: 64 IN | SYSTOLIC BLOOD PRESSURE: 151 MMHG | BODY MASS INDEX: 33.63 KG/M2

## 2018-12-28 DIAGNOSIS — G52.1 DISORDERS OF GLOSSOPHARYNGEAL NERVE: ICD-10-CM

## 2018-12-28 PROCEDURE — 11901 INJECT SKIN LESIONS >7: CPT

## 2018-12-28 PROCEDURE — 99213 OFFICE O/P EST LOW 20 MIN: CPT | Mod: 25

## 2019-03-15 ENCOUNTER — APPOINTMENT (OUTPATIENT)
Dept: OBGYN | Facility: CLINIC | Age: 69
End: 2019-03-15

## 2019-04-24 ENCOUNTER — APPOINTMENT (OUTPATIENT)
Dept: OBGYN | Facility: CLINIC | Age: 69
End: 2019-04-24

## 2019-10-04 ENCOUNTER — TRANSCRIPTION ENCOUNTER (OUTPATIENT)
Age: 69
End: 2019-10-04

## 2019-10-07 ENCOUNTER — APPOINTMENT (OUTPATIENT)
Dept: OBGYN | Facility: CLINIC | Age: 69
End: 2019-10-07
Payer: MEDICARE

## 2019-10-07 VITALS
DIASTOLIC BLOOD PRESSURE: 70 MMHG | BODY MASS INDEX: 34.15 KG/M2 | SYSTOLIC BLOOD PRESSURE: 120 MMHG | WEIGHT: 200 LBS | HEIGHT: 64 IN

## 2019-10-07 DIAGNOSIS — Z01.419 ENCOUNTER FOR GYNECOLOGICAL EXAMINATION (GENERAL) (ROUTINE) W/OUT ABNORMAL FINDINGS: ICD-10-CM

## 2019-10-07 PROCEDURE — G0101: CPT

## 2019-10-07 RX ORDER — METFORMIN HYDROCHLORIDE 1000 MG/1
1000 TABLET, FILM COATED, EXTENDED RELEASE ORAL
Refills: 0 | Status: ACTIVE | COMMUNITY

## 2019-10-07 RX ORDER — GLIMEPIRIDE 2 MG/1
2 TABLET ORAL
Refills: 0 | Status: ACTIVE | COMMUNITY
Start: 2017-05-15

## 2019-10-07 NOTE — CHIEF COMPLAINT
[Initial Visit] : initial GYN visit [FreeTextEntry1] : 67 yo G0 for annual visit, not seen by me in >3 yrs.

## 2019-10-07 NOTE — PHYSICAL EXAM
[Awake] : awake [Alert] : alert [Acute Distress] : no acute distress [LAD] : no lymphadenopathy [Thyroid Nodule] : no thyroid nodule [Goiter] : no goiter [Mass] : no breast mass [Nipple Discharge] : no nipple discharge [Axillary LAD] : no axillary lymphadenopathy [Soft] : soft [Tender] : non tender [Distended] : not distended [H/Smegaly] : no hepatosplenomegaly [Oriented x3] : oriented to person, place, and time [Depressed Mood] : not depressed [Flat Affect] : affect not flat [Normal] : cervix [No Bleeding] : there was no active vaginal bleeding [Absent] : absent [Uterine Adnexae] : were not tender and not enlarged [FreeTextEntry5] : vaginal narrows at apex. cervix not visible but palpably normal

## 2020-03-19 NOTE — SWALLOW BEDSIDE ASSESSMENT ADULT - SWALLOW EVAL: RECOMMENDED DIET
Name band;
SUGGEST A REGULAR TEXTURE DIET WITH THIN LIQUID CONSISTENCIES AS TOLERATED AS PATIENT APPEARED CLINICALLY TOLERANT OF THESE FOOD TEXTURES FROM AN OROPHARYNGEAL SWALLOWING STANCE WHEN IN AN ALERT STATE WHICH IS NOT ALWAYS THE CASE. THE PT MUST BE IN AN ALERT STATE WHEN EATING.

## 2020-10-09 ENCOUNTER — APPOINTMENT (OUTPATIENT)
Dept: OBGYN | Facility: CLINIC | Age: 70
End: 2020-10-09
Payer: MEDICARE

## 2020-10-09 VITALS
HEIGHT: 64 IN | RESPIRATION RATE: 16 BRPM | SYSTOLIC BLOOD PRESSURE: 112 MMHG | TEMPERATURE: 98.1 F | DIASTOLIC BLOOD PRESSURE: 60 MMHG | BODY MASS INDEX: 31.58 KG/M2 | WEIGHT: 185 LBS

## 2020-10-09 DIAGNOSIS — N60.01 SOLITARY CYST OF RIGHT BREAST: ICD-10-CM

## 2020-10-09 PROCEDURE — 99214 OFFICE O/P EST MOD 30 MIN: CPT

## 2020-10-09 NOTE — PHYSICAL EXAM
[Appropriately responsive] : appropriately responsive [Alert] : alert [No Acute Distress] : no acute distress [No Lymphadenopathy] : no lymphadenopathy [Regular Rate Rhythm] : regular rate rhythm [No Murmurs] : no murmurs [Clear to Auscultation B/L] : clear to auscultation bilaterally [Soft] : soft [Non-tender] : non-tender [Non-distended] : non-distended [No HSM] : No HSM [No Lesions] : no lesions [No Mass] : no mass [Oriented x3] : oriented x3 [Examination Of The Breasts] : a normal appearance [No Masses] : no breast masses were palpable [Labia Majora] : normal [Labia Minora] : normal [Normal] : normal [Absent] : absent [Uterine Adnexae] : normal [No Tenderness] : no tenderness [Nl Sphincter Tone] : normal sphincter tone [FreeTextEntry9] : Elías negative

## 2020-10-09 NOTE — DISCUSSION/SUMMARY
[FreeTextEntry1] : Health Maintenance:\par pap today\par TBSE\par mammo with breast us\par Vit D 1000 IUs daily, calcium of 1100mg daily thru diet of dark green leafy vegetables, low-fat milk products and exercise TIW.  Rx provided for DEXA\par \par Breast cyst:\par 3/2020 right sided cyst at [facility]\par order mammo and breast US today\par

## 2020-10-09 NOTE — HISTORY OF PRESENT ILLNESS
[FreeTextEntry1] : 69 Go for annual.\par Prior HPV but normal 2017 and 2018.\par 2012 supracervical hyst with BS. Ovaries in place.\par Remote history of uterine ablation.\par \par No PBM. No changes to bowel nor bladder. \par \par Metabolic syndrome treated my Dr Melvina Duong Metformin and Glimerperide.  HTN on Enalapril.\par Requesting BDS.

## 2020-12-21 PROBLEM — Z01.419 ENCOUNTER FOR GYNECOLOGICAL EXAMINATION: Status: RESOLVED | Noted: 2017-03-02 | Resolved: 2020-12-21

## 2021-07-20 ENCOUNTER — EMERGENCY (EMERGENCY)
Facility: HOSPITAL | Age: 71
LOS: 0 days | Discharge: HOME | End: 2021-07-20
Attending: EMERGENCY MEDICINE
Payer: MEDICARE

## 2021-07-20 VITALS
HEART RATE: 108 BPM | SYSTOLIC BLOOD PRESSURE: 169 MMHG | DIASTOLIC BLOOD PRESSURE: 82 MMHG | OXYGEN SATURATION: 97 % | RESPIRATION RATE: 18 BRPM | TEMPERATURE: 98 F | HEIGHT: 62 IN | WEIGHT: 179.9 LBS

## 2021-07-20 VITALS
OXYGEN SATURATION: 100 % | SYSTOLIC BLOOD PRESSURE: 150 MMHG | HEART RATE: 110 BPM | TEMPERATURE: 98 F | DIASTOLIC BLOOD PRESSURE: 90 MMHG

## 2021-07-20 DIAGNOSIS — Z79.84 LONG TERM (CURRENT) USE OF ORAL HYPOGLYCEMIC DRUGS: ICD-10-CM

## 2021-07-20 DIAGNOSIS — Z88.8 ALLERGY STATUS TO OTHER DRUGS, MEDICAMENTS AND BIOLOGICAL SUBSTANCES STATUS: ICD-10-CM

## 2021-07-20 DIAGNOSIS — Z79.899 OTHER LONG TERM (CURRENT) DRUG THERAPY: ICD-10-CM

## 2021-07-20 DIAGNOSIS — Z88.0 ALLERGY STATUS TO PENICILLIN: ICD-10-CM

## 2021-07-20 DIAGNOSIS — R06.02 SHORTNESS OF BREATH: ICD-10-CM

## 2021-07-20 DIAGNOSIS — R07.0 PAIN IN THROAT: ICD-10-CM

## 2021-07-20 DIAGNOSIS — R05 COUGH: ICD-10-CM

## 2021-07-20 DIAGNOSIS — J45.909 UNSPECIFIED ASTHMA, UNCOMPLICATED: ICD-10-CM

## 2021-07-20 PROCEDURE — 99283 EMERGENCY DEPT VISIT LOW MDM: CPT

## 2021-07-20 PROCEDURE — 94640 AIRWAY INHALATION TREATMENT: CPT

## 2021-07-20 PROCEDURE — 99283 EMERGENCY DEPT VISIT LOW MDM: CPT | Mod: 25

## 2021-07-20 PROCEDURE — 82962 GLUCOSE BLOOD TEST: CPT

## 2021-07-20 PROCEDURE — 71046 X-RAY EXAM CHEST 2 VIEWS: CPT | Mod: 26

## 2021-07-20 PROCEDURE — 71046 X-RAY EXAM CHEST 2 VIEWS: CPT

## 2021-07-20 RX ORDER — ALBUTEROL 90 UG/1
2 AEROSOL, METERED ORAL EVERY 6 HOURS
Refills: 0 | Status: DISCONTINUED | OUTPATIENT
Start: 2021-07-20 | End: 2021-07-20

## 2021-07-20 RX ADMIN — ALBUTEROL 2 PUFF(S): 90 AEROSOL, METERED ORAL at 04:39

## 2021-07-20 NOTE — ED PROVIDER NOTE - PATIENT PORTAL LINK FT
You can access the FollowMyHealth Patient Portal offered by [facility] by registering at the following website: [email or web address]. By joining [employer]’s FollowMyHealth portal, you will also be able to view your health information using other applications (apps) compatible with our system.

## 2021-07-20 NOTE — ED ADULT NURSE NOTE - PMH
S/P PC IOL, OS- DOING WELL. CONTINUE DROPS AS DIRECTED. FOLLOW. Asthma, unspecified asthma severity, unspecified whether complicated, unspecified whether persistent

## 2021-07-20 NOTE — ED ADULT TRIAGE NOTE - NSWEIGHTCALCTOOLDRUG_GEN_A_CORE
[email or web address]

## 2021-07-20 NOTE — ED PROVIDER NOTE - NSFOLLOWUPINSTRUCTIONS_ED_ALL_ED_FT
please follow up with pulmonary.   you will receive a call today from our staff for appointment time.   continue with medications as prescribed.   drink plenty of fluids.   return to ED for any concerns

## 2021-07-20 NOTE — ED PROVIDER NOTE - OBJECTIVE STATEMENT
69 y/o female in ED c/o persistent cough and scratchy throat x 1 wk.   pt states on abx for bronchitis.    pt denies any fever, HA, cp, n/v/d/abd pain.   tolerating PO.   no sick contacts or recent travel.

## 2021-07-20 NOTE — ED ADULT TRIAGE NOTE - CHIEF COMPLAINT QUOTE
pt presents to ED with complaints of cough. pt was diagnosed with bronchitis this week and was placed on abx. pt states she feels as if cough is worsening.

## 2021-08-16 PROBLEM — J45.909 UNSPECIFIED ASTHMA, UNCOMPLICATED: Chronic | Status: ACTIVE | Noted: 2021-07-20

## 2021-10-21 ENCOUNTER — NON-APPOINTMENT (OUTPATIENT)
Age: 71
End: 2021-10-21

## 2021-10-22 ENCOUNTER — APPOINTMENT (OUTPATIENT)
Dept: OBGYN | Facility: CLINIC | Age: 71
End: 2021-10-22
Payer: MEDICARE

## 2021-10-22 VITALS
RESPIRATION RATE: 16 BRPM | HEIGHT: 64 IN | WEIGHT: 185 LBS | DIASTOLIC BLOOD PRESSURE: 64 MMHG | BODY MASS INDEX: 31.58 KG/M2 | SYSTOLIC BLOOD PRESSURE: 132 MMHG | TEMPERATURE: 97.2 F

## 2021-10-22 PROCEDURE — 99214 OFFICE O/P EST MOD 30 MIN: CPT

## 2021-10-22 NOTE — HISTORY OF PRESENT ILLNESS
[FreeTextEntry1] : 69 yo  with LMP age ~56 and hystererectomy .\par \par CC: loose stools since up for senior housing, stress has led to wt loss\par        upcoming endoscopy/colonoscopy \par \par Max wt 200 nor 186\par \par Menstrual triad: 12 x 28-35 x 7\par \par OB:\par  TOP\par \par GYN:\par OCP for regulation of cycles\par  HPV\par Menorrhaghia - Lupron for myomas\par  endometrial ablation\par  myoma, supracervical hysterectomy (ovaries in place)\par \par Med:\par DM\par depression buspirone\par athsma\par \par sx:\par Deviated septum \par l/s cholecystectomy \par right inner upper breast biopsy\par \par

## 2021-10-22 NOTE — PHYSICAL EXAM
[Appropriately responsive] : appropriately responsive [Alert] : alert [No Acute Distress] : no acute distress [No Lymphadenopathy] : no lymphadenopathy [Soft] : soft [Non-tender] : non-tender [Non-distended] : non-distended [No HSM] : No HSM [No Lesions] : no lesions [No Mass] : no mass [Oriented x3] : oriented x3 [Examination Of The Breasts] : a normal appearance [No Masses] : no breast masses were palpable [Vulvar Atrophy] : vulvar atrophy [Labia Majora] : normal [Labia Minora] : normal [Atrophy] : atrophy [Normal] : normal [Uterine Adnexae] : normal [FreeTextEntry9] : Elías neg

## 2021-12-07 ENCOUNTER — INPATIENT (INPATIENT)
Facility: HOSPITAL | Age: 71
LOS: 13 days | Discharge: HOME | DRG: 885 | End: 2021-12-21
Attending: PSYCHIATRY & NEUROLOGY | Admitting: PSYCHIATRY & NEUROLOGY
Payer: MEDICARE

## 2021-12-07 VITALS — WEIGHT: 169.09 LBS | HEIGHT: 62 IN

## 2021-12-07 DIAGNOSIS — F41.1 GENERALIZED ANXIETY DISORDER: ICD-10-CM

## 2021-12-07 DIAGNOSIS — F33.2 MAJOR DEPRESSIVE DISORDER, RECURRENT SEVERE WITHOUT PSYCHOTIC FEATURES: ICD-10-CM

## 2021-12-07 LAB
ANION GAP SERPL CALC-SCNC: 7 MMOL/L — SIGNIFICANT CHANGE UP (ref 5–17)
APAP SERPL-MCNC: <2 UG/ML — LOW (ref 10–30)
APPEARANCE UR: CLEAR — SIGNIFICANT CHANGE UP
BASOPHILS # BLD AUTO: 0.06 K/UL — SIGNIFICANT CHANGE UP (ref 0–0.2)
BASOPHILS NFR BLD AUTO: 0.6 % — SIGNIFICANT CHANGE UP (ref 0–2)
BILIRUB UR-MCNC: NEGATIVE — SIGNIFICANT CHANGE UP
BUN SERPL-MCNC: 16 MG/DL — SIGNIFICANT CHANGE UP (ref 7–23)
CALCIUM SERPL-MCNC: 9.2 MG/DL — SIGNIFICANT CHANGE UP (ref 8.5–10.1)
CHLORIDE SERPL-SCNC: 108 MMOL/L — SIGNIFICANT CHANGE UP (ref 96–108)
CO2 SERPL-SCNC: 24 MMOL/L — SIGNIFICANT CHANGE UP (ref 22–31)
COLOR SPEC: YELLOW — SIGNIFICANT CHANGE UP
CREAT SERPL-MCNC: 0.98 MG/DL — SIGNIFICANT CHANGE UP (ref 0.5–1.3)
DIFF PNL FLD: NEGATIVE — SIGNIFICANT CHANGE UP
EOSINOPHIL # BLD AUTO: 0.07 K/UL — SIGNIFICANT CHANGE UP (ref 0–0.5)
EOSINOPHIL NFR BLD AUTO: 0.7 % — SIGNIFICANT CHANGE UP (ref 0–6)
ETHANOL SERPL-MCNC: <10 MG/DL — SIGNIFICANT CHANGE UP (ref 0–10)
GLUCOSE SERPL-MCNC: 167 MG/DL — HIGH (ref 70–99)
GLUCOSE UR QL: NEGATIVE MG/DL — SIGNIFICANT CHANGE UP
HCT VFR BLD CALC: 38 % — SIGNIFICANT CHANGE UP (ref 34.5–45)
HGB BLD-MCNC: 11.9 G/DL — SIGNIFICANT CHANGE UP (ref 11.5–15.5)
IMM GRANULOCYTES NFR BLD AUTO: 0.3 % — SIGNIFICANT CHANGE UP (ref 0–1.5)
KETONES UR-MCNC: ABNORMAL
LEUKOCYTE ESTERASE UR-ACNC: NEGATIVE — SIGNIFICANT CHANGE UP
LYMPHOCYTES # BLD AUTO: 1.71 K/UL — SIGNIFICANT CHANGE UP (ref 1–3.3)
LYMPHOCYTES # BLD AUTO: 16.7 % — SIGNIFICANT CHANGE UP (ref 13–44)
MCHC RBC-ENTMCNC: 25.3 PG — LOW (ref 27–34)
MCHC RBC-ENTMCNC: 31.3 GM/DL — LOW (ref 32–36)
MCV RBC AUTO: 80.7 FL — SIGNIFICANT CHANGE UP (ref 80–100)
MONOCYTES # BLD AUTO: 0.57 K/UL — SIGNIFICANT CHANGE UP (ref 0–0.9)
MONOCYTES NFR BLD AUTO: 5.6 % — SIGNIFICANT CHANGE UP (ref 2–14)
NEUTROPHILS # BLD AUTO: 7.79 K/UL — HIGH (ref 1.8–7.4)
NEUTROPHILS NFR BLD AUTO: 76.1 % — SIGNIFICANT CHANGE UP (ref 43–77)
NITRITE UR-MCNC: NEGATIVE — SIGNIFICANT CHANGE UP
PCP SPEC-MCNC: SIGNIFICANT CHANGE UP
PH UR: 5 — SIGNIFICANT CHANGE UP (ref 5–8)
PLATELET # BLD AUTO: 291 K/UL — SIGNIFICANT CHANGE UP (ref 150–400)
POTASSIUM SERPL-MCNC: 4 MMOL/L — SIGNIFICANT CHANGE UP (ref 3.5–5.3)
POTASSIUM SERPL-SCNC: 4 MMOL/L — SIGNIFICANT CHANGE UP (ref 3.5–5.3)
PROT UR-MCNC: NEGATIVE MG/DL — SIGNIFICANT CHANGE UP
RBC # BLD: 4.71 M/UL — SIGNIFICANT CHANGE UP (ref 3.8–5.2)
RBC # FLD: 18.3 % — HIGH (ref 10.3–14.5)
SALICYLATES SERPL-MCNC: <1.7 MG/DL — LOW (ref 2.8–20)
SARS-COV-2 RNA SPEC QL NAA+PROBE: SIGNIFICANT CHANGE UP
SODIUM SERPL-SCNC: 139 MMOL/L — SIGNIFICANT CHANGE UP (ref 135–145)
SP GR SPEC: 1.01 — SIGNIFICANT CHANGE UP (ref 1.01–1.02)
UROBILINOGEN FLD QL: NEGATIVE MG/DL — SIGNIFICANT CHANGE UP
WBC # BLD: 10.23 K/UL — SIGNIFICANT CHANGE UP (ref 3.8–10.5)
WBC # FLD AUTO: 10.23 K/UL — SIGNIFICANT CHANGE UP (ref 3.8–10.5)

## 2021-12-07 PROCEDURE — 99285 EMERGENCY DEPT VISIT HI MDM: CPT

## 2021-12-07 RX ORDER — MOMETASONE FUROATE 220 UG/1
1 INHALANT RESPIRATORY (INHALATION)
Refills: 0 | Status: DISCONTINUED | OUTPATIENT
Start: 2021-12-07 | End: 2021-12-21

## 2021-12-07 RX ORDER — MIRTAZAPINE 45 MG/1
30 TABLET, ORALLY DISINTEGRATING ORAL AT BEDTIME
Refills: 0 | Status: DISCONTINUED | OUTPATIENT
Start: 2021-12-07 | End: 2021-12-09

## 2021-12-07 RX ORDER — METFORMIN HYDROCHLORIDE 850 MG/1
1000 TABLET ORAL
Refills: 0 | Status: DISCONTINUED | OUTPATIENT
Start: 2021-12-07 | End: 2021-12-21

## 2021-12-07 RX ORDER — GLIMEPIRIDE 1 MG
4 TABLET ORAL DAILY
Refills: 0 | Status: DISCONTINUED | OUTPATIENT
Start: 2021-12-07 | End: 2021-12-07

## 2021-12-07 RX ORDER — ATORVASTATIN CALCIUM 80 MG/1
10 TABLET, FILM COATED ORAL AT BEDTIME
Refills: 0 | Status: DISCONTINUED | OUTPATIENT
Start: 2021-12-07 | End: 2021-12-21

## 2021-12-07 RX ORDER — ALPRAZOLAM 0.25 MG
0.25 TABLET ORAL AT BEDTIME
Refills: 0 | Status: DISCONTINUED | OUTPATIENT
Start: 2021-12-07 | End: 2021-12-10

## 2021-12-07 RX ADMIN — Medication 0.25 MILLIGRAM(S): at 22:51

## 2021-12-07 RX ADMIN — ATORVASTATIN CALCIUM 10 MILLIGRAM(S): 80 TABLET, FILM COATED ORAL at 22:51

## 2021-12-07 RX ADMIN — MIRTAZAPINE 30 MILLIGRAM(S): 45 TABLET, ORALLY DISINTEGRATING ORAL at 22:55

## 2021-12-07 RX ADMIN — METFORMIN HYDROCHLORIDE 1000 MILLIGRAM(S): 850 TABLET ORAL at 22:51

## 2021-12-07 RX ADMIN — Medication 15 MILLIGRAM(S): at 22:52

## 2021-12-07 NOTE — ED BEHAVIORAL HEALTH ASSESSMENT NOTE - DIFFERENTIAL
MDD, BORIS, r/o mood/anxiety disorder secondary to general medical condition or medications, adjustment disorder, bipolar disorder

## 2021-12-07 NOTE — ED PROVIDER NOTE - CLINICAL SUMMARY MEDICAL DECISION MAKING FREE TEXT BOX
Mani Bronson, PGY3: 71 year old female p/w SI in the setting of increased stress. Thought about taking a handful of pills last week. Currently denies SI/HI/AVH. Plan for medical clearance, psych eval, reassess.

## 2021-12-07 NOTE — ED BEHAVIORAL HEALTH ASSESSMENT NOTE - DETAILS
Discussed with ED attending No contact information at this time Would not impact clinical decisionmaking at this time See HPI

## 2021-12-07 NOTE — ED PROVIDER NOTE - OBJECTIVE STATEMENT
71 year old female PMH HTN, HLD, DM, anxiety, depression, presents to the ED for psych eval. Patient states she changed housing in August and works 2 jobs. She has been feeling overly stressed and anxious recently. She endorses feeling chest tightness, shortness of breath, and palpitations occasionally when she gets anxious. She is not presently feeling these symptoms. Also notes hx of anemia and diarrhea which is being evaluated by GI. Patient states last week she thought about taking extra pills to end her discomfort. Not actively expressing SI. She denies HI or AVH. She was hospitalized psychiatrically 30 years ago. She denies recreational drug or alcohol use.     Psychiatrist- Dr. Azar

## 2021-12-07 NOTE — ED ADULT TRIAGE NOTE - CHIEF COMPLAINT QUOTE
pt presents to Ed with complaints of anxiety since august. pt has hx of anxiety and depression that she has been under care of out pt psychiatrist for both for 5 years. pt reports anxiety has been worsening recently. pt endorses recent stressor of moving. pt endorses thinking about "taking a few extra pills" for approximately 2 weeks. pt reports she wants help because she does not really want to die. pt presents to Ed with complaints of anxiety since august. pt has hx of anxiety and depression that she has been under care of out pt psychiatrist for both for 5 years. pt reports anxiety has been worsening recently. pt endorses recent stressor of moving. pt endorses thinking about "taking a few extra pills" for approximately 2 weeks. pt reports she wants help because she does not really want to die.    pt's sister Yesica can be reached at [phone].

## 2021-12-07 NOTE — ED PROVIDER NOTE - ATTENDING CONTRIBUTION TO CARE
Suicidal ideation, had thoughts about taking handful of pills. Not actively suicidality but seems to have some social risk factors. No apparent medical complaints. Psychiatry consulted to determine need for admission.

## 2021-12-07 NOTE — ED ADULT TRIAGE NOTE - DIRECT TO ROOM CARE INITIATED:
PROCEDURE  ECG 12 Lead Documentation  Date/Time: 10/5/2017 10:50 AM  Performed by: Germán Washington  Authorized by: Germán Washington     Comments:      Marked sinus bradycardia incomplete right bundle branch block cannot rule out anterior infarct nonspecific changes  The rate is 41 beats per minute 
07-Dec-2021 14:22

## 2021-12-07 NOTE — ED PROVIDER NOTE - CARE PLAN
Principal Discharge DX:	Suicidal ideations  Secondary Diagnosis:	Anxiety  Secondary Diagnosis:	Major depression   1

## 2021-12-07 NOTE — ED BEHAVIORAL HEALTH ASSESSMENT NOTE - SUMMARY
71F, living alone in basement apt, employed working two jobs, with PMH of HTN, DM2, asthma, HLD, psych hx of MDD, anxiety, no known suicide attempts, 2 prior hospitalizations in 1980s, currently in outpatient care, no substance abuse, now BIBS after speaking with sister for evaluation of several months of worsening depression and anxiety.     Patient has had a significant decline in function owing to worsening anxiety and depression, and this weekend had thoughts of overdosing. She has been getting worse despite outpatient treatment and at this point would benefit from inpatient hospitalization for safety, stabilization, and medication titration.

## 2021-12-07 NOTE — ED ADULT NURSE NOTE - CHIEF COMPLAINT QUOTE
pt presents to Ed with complaints of anxiety since august. pt has hx of anxiety and depression that she has been under care of out pt psychiatrist for both for 5 years. pt reports anxiety has been worsening recently. pt endorses recent stressor of moving. pt endorses thinking about "taking a few extra pills" for approximately 2 weeks. pt reports she wants help because she does not really want to die.    pt's sister Yesica can be reached at [phone].
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
## 2021-12-07 NOTE — ED BEHAVIORAL HEALTH ASSESSMENT NOTE - PSYCHIATRIC ISSUES AND PLAN (INCLUDE STANDING AND PRN MEDICATION)
continue remeron 30mg PO qHS, buspar 15mg PO TID, xanax .25mg PO BID. Please obtain phone number for sister and for psychiatrist in patients phone with security

## 2021-12-07 NOTE — ED PROVIDER NOTE - NS ED ROS FT
GENERAL: no fever, no chills  EYES: no change in vision, no irritation, no discharge, no redness, no pain  HEENT: no trouble swallowing or speaking, no throat pain, no ear pain  CARDIAC: +chest pain, +palpitations   PULMONARY: no cough, +shortness of breath, no wheezing  GI: no abdominal pain, no nausea, no vomiting, +diarrhea, no constipation  : no changes in urination, no dysuria  SKIN: no rashes  NEURO: no headache, no numbness, no weakness  MSK: no joint pain, no muscle pain, no back pain, no calf pain   PSYCH: +anxiety, +depression

## 2021-12-07 NOTE — ED BEHAVIORAL HEALTH ASSESSMENT NOTE - RISK ASSESSMENT
Moderate Acute Suicide Risk risk factors include mood disorder, anxiety disorder, current depression and anxiety, insomnia, anhedonia, recent suicide ideation, ongoing psychosocial stressors. protective factors include lack of prior attempts, lack of psychosis, sobriety, adherence to medications, engagement in work and treatment, supportive family

## 2021-12-07 NOTE — ED ADULT NURSE NOTE - OBJECTIVE STATEMENT
pt arrived to ED via self. pt states her sister told her she should come to hospital.  pt states she has been having a lot of anxiety due to change in living situation. pt states she is going to be moving to senior living and is causing her a lot of stress. pt states she sees a psychiatrist for her anxiety and is prescribed a lot of medications and " doesn't know if shes being over medicated." pt states she has no thoughts of hurting others however states she has had the " thought of taking a bunch of pills." pt denies attempted suicide in the past. pt states she has been hospitalized in the past for her anxiety and depression and " just wants to feel better. pt alert and oriented. skin intact with no signs of trauma. Pt states PMH of anemia. diabetes, asthma .

## 2021-12-07 NOTE — ED ADULT NURSE REASSESSMENT NOTE - NS ED NURSE REASSESS COMMENT FT1
Pt resting comfortably in bed, calm and cooperative, denies any pain/ symptoms at this time, vital signs stable, awaiting telepsych, will reassess. Pt resting comfortably in bed, calm and cooperative, denies SI/ HI and any pain/ symptoms at this time, vital signs stable, awaiting telepsych, will reassess.

## 2021-12-07 NOTE — ED ADULT TRIAGE NOTE - AS HEIGHT TYPE
stated Complex Repair And Melolabial Flap Text: The defect edges were debeveled with a #15 scalpel blade.  The primary defect was closed partially with a complex linear closure.  Given the location of the remaining defect, shape of the defect and the proximity to free margins a melolabial flap was deemed most appropriate for complete closure of the defect.  Using a sterile surgical marker, an appropriate advancement flap was drawn incorporating the defect and placing the expected incisions within the relaxed skin tension lines where possible.    The area thus outlined was incised deep to adipose tissue with a #15 scalpel blade.  The skin margins were undermined to an appropriate distance in all directions utilizing iris scissors.

## 2021-12-07 NOTE — ED BEHAVIORAL HEALTH ASSESSMENT NOTE - HPI (INCLUDE ILLNESS QUALITY, SEVERITY, DURATION, TIMING, CONTEXT, MODIFYING FACTORS, ASSOCIATED SIGNS AND SYMPTOMS)
71F, living alone in basement apt, employed working two jobs, with PMH of HTN, DM2, asthma, HLD, psych hx of MDD, anxiety, no known suicide attempts, 2 prior hospitalizations in 1980s, currently in outpatient care, no substance abuse, now BIBS after speaking with sister for evaluation of several months of worsening depression and anxiety.     Patient consented to remote evaluation    Patient states that she had been doing well, stable on long term medication regimen of remeron, buspar, until august when she found out that she was up for senior housing and also that she would have to leave one of her jobs. She states that at that point she started feeling more stressed and anxious, with worsening GI discomfort, which is where she states that she holds her stress. In october she had a routine checkup and was told that she was anemic, referred to GI doctor and had a scope 3-4 weeks ago. Sicne then more anxiety symptoms progressing to the point of her not being able to drive this week. She also reports low mood, anhedonia, insomnia (6h per night vs 8 when baseline), fatigue, difficulty concentrating, racing thoughts. She denies that she wants to hurt or kill herself, but states that she wants these feelings to stop. When asked specifically she acknowledged that this weekend she had thoughts of overdosing to end her pain but denies any preparatory action, denies any current plan or intent. Denies having access to firearms, denies etoh and drug use, denies changes in medications other than starting xanax a month or two ago (istop confirmed, .25mg PO BID), denies auditory, visual, or tactile hallucinations, denies paranoia. She states that today she came home and called her sister who told her it was a good idea for her to come to the hospital so she did. She is not sure if her medications are helping and does not want to do. She is amenable to inpatient psychiatric hospitalization at this time.     Patient gave permission for team to speak with her psychiatrist and sister but does not have their numbers as her phone is locked away

## 2021-12-08 DIAGNOSIS — R45.851 SUICIDAL IDEATIONS: ICD-10-CM

## 2021-12-08 PROCEDURE — 84443 ASSAY THYROID STIM HORMONE: CPT

## 2021-12-08 PROCEDURE — 99222 1ST HOSP IP/OBS MODERATE 55: CPT

## 2021-12-08 PROCEDURE — 80164 ASSAY DIPROPYLACETIC ACD TOT: CPT

## 2021-12-08 PROCEDURE — 82962 GLUCOSE BLOOD TEST: CPT

## 2021-12-08 PROCEDURE — 80061 LIPID PANEL: CPT

## 2021-12-08 PROCEDURE — 36415 COLL VENOUS BLD VENIPUNCTURE: CPT

## 2021-12-08 PROCEDURE — 94640 AIRWAY INHALATION TREATMENT: CPT

## 2021-12-08 PROCEDURE — 83036 HEMOGLOBIN GLYCOSYLATED A1C: CPT

## 2021-12-08 RX ORDER — DIPHENHYDRAMINE HCL 50 MG
25 CAPSULE ORAL ONCE
Refills: 0 | Status: COMPLETED | OUTPATIENT
Start: 2021-12-08 | End: 2021-12-08

## 2021-12-08 RX ORDER — DEXTROSE 50 % IN WATER 50 %
25 SYRINGE (ML) INTRAVENOUS ONCE
Refills: 0 | Status: DISCONTINUED | OUTPATIENT
Start: 2021-12-08 | End: 2021-12-21

## 2021-12-08 RX ORDER — ALBUTEROL 90 UG/1
2 AEROSOL, METERED ORAL EVERY 6 HOURS
Refills: 0 | Status: DISCONTINUED | OUTPATIENT
Start: 2021-12-08 | End: 2021-12-21

## 2021-12-08 RX ORDER — GLUCAGON INJECTION, SOLUTION 0.5 MG/.1ML
1 INJECTION, SOLUTION SUBCUTANEOUS ONCE
Refills: 0 | Status: DISCONTINUED | OUTPATIENT
Start: 2021-12-08 | End: 2021-12-21

## 2021-12-08 RX ORDER — INSULIN LISPRO 100/ML
VIAL (ML) SUBCUTANEOUS
Refills: 0 | Status: DISCONTINUED | OUTPATIENT
Start: 2021-12-08 | End: 2021-12-21

## 2021-12-08 RX ORDER — SODIUM CHLORIDE 9 MG/ML
1000 INJECTION, SOLUTION INTRAVENOUS
Refills: 0 | Status: DISCONTINUED | OUTPATIENT
Start: 2021-12-08 | End: 2021-12-21

## 2021-12-08 RX ORDER — DEXTROSE 50 % IN WATER 50 %
12.5 SYRINGE (ML) INTRAVENOUS ONCE
Refills: 0 | Status: DISCONTINUED | OUTPATIENT
Start: 2021-12-08 | End: 2021-12-21

## 2021-12-08 RX ORDER — LOPERAMIDE HCL 2 MG
2 TABLET ORAL ONCE
Refills: 0 | Status: COMPLETED | OUTPATIENT
Start: 2021-12-08 | End: 2021-12-08

## 2021-12-08 RX ORDER — DEXTROSE 50 % IN WATER 50 %
15 SYRINGE (ML) INTRAVENOUS ONCE
Refills: 0 | Status: DISCONTINUED | OUTPATIENT
Start: 2021-12-08 | End: 2021-12-21

## 2021-12-08 RX ORDER — LOPERAMIDE HCL 2 MG
2 TABLET ORAL DAILY
Refills: 0 | Status: DISCONTINUED | OUTPATIENT
Start: 2021-12-08 | End: 2021-12-21

## 2021-12-08 RX ORDER — INSULIN LISPRO 100/ML
VIAL (ML) SUBCUTANEOUS AT BEDTIME
Refills: 0 | Status: DISCONTINUED | OUTPATIENT
Start: 2021-12-08 | End: 2021-12-21

## 2021-12-08 RX ADMIN — Medication 0.25 MILLIGRAM(S): at 21:17

## 2021-12-08 RX ADMIN — METFORMIN HYDROCHLORIDE 1000 MILLIGRAM(S): 850 TABLET ORAL at 21:13

## 2021-12-08 RX ADMIN — Medication 15 MILLIGRAM(S): at 07:48

## 2021-12-08 RX ADMIN — MOMETASONE FUROATE 1 PUFF(S): 220 INHALANT RESPIRATORY (INHALATION) at 21:22

## 2021-12-08 RX ADMIN — Medication 2 MILLIGRAM(S): at 12:10

## 2021-12-08 RX ADMIN — Medication 15 MILLIGRAM(S): at 21:18

## 2021-12-08 RX ADMIN — Medication 5 MILLIGRAM(S): at 10:21

## 2021-12-08 RX ADMIN — Medication 15 MILLIGRAM(S): at 14:44

## 2021-12-08 RX ADMIN — Medication 0.5 MILLIGRAM(S): at 20:29

## 2021-12-08 RX ADMIN — Medication 25 MILLIGRAM(S): at 21:17

## 2021-12-08 RX ADMIN — MIRTAZAPINE 30 MILLIGRAM(S): 45 TABLET, ORALLY DISINTEGRATING ORAL at 20:29

## 2021-12-08 RX ADMIN — ATORVASTATIN CALCIUM 10 MILLIGRAM(S): 80 TABLET, FILM COATED ORAL at 20:29

## 2021-12-08 RX ADMIN — MOMETASONE FUROATE 1 PUFF(S): 220 INHALANT RESPIRATORY (INHALATION) at 12:09

## 2021-12-08 RX ADMIN — METFORMIN HYDROCHLORIDE 1000 MILLIGRAM(S): 850 TABLET ORAL at 10:22

## 2021-12-08 NOTE — ED ADULT NURSE REASSESSMENT NOTE - NS ED NURSE REASSESS COMMENT FT1
Pt resting comfortably in bed, calm and cooperative, denies SI/HI, any pain/ symptoms at this time, vital signs stable, awaiting bed, will reassess.

## 2021-12-08 NOTE — PROGRESS NOTE BEHAVIORAL HEALTH - RISK ASSESSMENT
HIGH RISK    ACUTE RISK FACTORS: anxiety, depressed mood, hopelessness, anhedonia, suicidal ideation     CHRONIC RISK FACTORS: Depression, Anxiety, history of admission      PROTECTIVE FACTORS: supportive family, help-seeking    MITIGATION STRATEGIES: in-patient hospitalization, medication management, therapy, Constant Observation

## 2021-12-08 NOTE — CONSULT NOTE ADULT - ASSESSMENT
ASSESSMENT: Patient is a 72 y/o F with pmhx of DM2, HTN, HLD, asthma, gastritis. With pphx of MDD, anxiety, 2 prior psych hospitalizations in 1980s. Patient presents to ED with worsening depression and anxiety for several months, with passive suicidal ideation. Patient being admitted to inpatient psychiatry for stabilization and further management.     PLAN:   1. PSYCH/ DEPRESSION/ ANXIETY/ SI   -admit ton 5N   -Suicide Precautions (no sharps, laces, drawstrings)   -On Remeron, Buspar, prn Ativan   -Xanax for insomnia, Benadryl 0.25 x 1 dose prn for insomnia tonight    -Further medication as per psych     2. ASTHMA   -currently asymptomatic  -On Asmanex  -Proventil inhaler prn for SOB and wheezing     3. DM/ HLD/ HTN   -Metformin was resumed   -Patient states she does finger stick checks at home   -Insulin SS with correctional Insulin prn   -Diabetic Diet   -A1c in AM   -home medications resumed for HTN and HLD     4. DIARRHEA   -not currently, but patient with hx of recent GI upsets followed by specialist, had scope a month ago dx with gastritis   -Imodium prn for diarrhea     5. DVT PROPHYLAXIS   -Patient is ambulatory, pharmacologic ASSESSMENT: Patient is a 70 y/o F with pmhx of DM2, HTN, HLD, asthma, gastritis. With pphx of MDD, anxiety, 2 prior psych hospitalizations in 1980s. Patient presents to ED with worsening depression and anxiety for several months, with passive suicidal ideation. Patient being admitted to inpatient psychiatry for stabilization and further management.     PLAN:   1. PSYCH/ DEPRESSION/ ANXIETY/ SI   -admit to 5N   -Suicide Precautions (no sharps, laces, drawstrings)   -On Remeron, Buspar, prn Ativan   -Xanax for insomnia, Benadryl 0.25 x 1 dose prn for insomnia tonight    -Further medication as per psych     2. ASTHMA   -currently asymptomatic  -On Asmanex  -Proventil inhaler prn for SOB and wheezing   -Monitor O2 saturation     3. DM/ HLD/ HTN   -Metformin was resumed   -Patient states she does finger stick checks at home   -Insulin SS with correctional Insulin prn   -Diabetic Diet   -A1c in AM   -home medications resumed for HTN and HLD     4. DIARRHEA   -not currently, but patient with hx of recent GI upsets followed by specialist, had scope a month ago dx with gastritis   -Imodium prn for diarrhea     5. DVT PROPHYLAXIS   -Patient is ambulatory, pharmacologic prophylaxis not warranted at this time. Encourage frequent ambulation     Will sign off, reconsult Hospitalist service as needed.   Case and Plan reviewed by Attending Physician

## 2021-12-08 NOTE — PATIENT PROFILE BEHAVIORAL HEALTH - FALL HARM RISK - UNIVERSAL INTERVENTIONS
Bed in lowest position, wheels locked, appropriate side rails in place/Call bell, personal items and telephone in reach/Instruct patient to call for assistance before getting out of bed or chair/Non-slip footwear when patient is out of bed/[place] to call system/Physically safe environment - no spills, clutter or unnecessary equipment/Purposeful Proactive Rounding/Room/bathroom lighting operational, light cord in reach

## 2021-12-08 NOTE — PROGRESS NOTE BEHAVIORAL HEALTH - NSBHFUPINTERVALHXFT_PSY_A_CORE
Patient is alert and oriented to person, time, place and situation. Patient complaining of anxiety, depressed mood, helplessness, intermittent hopelessness but no suicidal ideation. Denies manic / psychotic symptoms. No delusions elicited. Reports wanting in-patient hospitalization for safety and stabilization of symptoms given recent thoughts of suicidal ideation to overdose.

## 2021-12-08 NOTE — CONSULT NOTE ADULT - SUBJECTIVE AND OBJECTIVE BOX
HOSPITALIST PA CONSULT NOTE     ADMITTING DIAGNOSIS: Worsening Depression/ Anxiety with Suicidal Ideation     HISTORY OF THE PRESENT ILLNESS: Patient is a 72 y/o F with pmhx of DM2, HTN, HLD, ashtma, gastritis. With pphx of MDD, anxiety, 2 prior psych hospitalizations in . Patient presents to ED with worsening depression and anxiety for several months, was urged to present to ED by her sister. Patient states she is extremely anxious and life situations has     PAST MEDICAL HISTORY:    PAST SURGICAL HISTORY:    FAMILY HISTORY:   non-contributory to the patient's current presentation    SOCIAL HISTORY:  no smoking, no alcohol, no drugs    REVIEW OF SYSTEMS:   All 10 systems reviewed in detailed and found to be negative with the exception of what has already been described above    MEDICATIONS  (STANDING):  ALPRAZolam 0.25 milliGRAM(s) Oral at bedtime  atorvastatin 10 milliGRAM(s) Oral at bedtime  busPIRone 15 milliGRAM(s) Oral three times a day  dextrose 40% Gel 15 Gram(s) Oral once  dextrose 5%. 1000 milliLiter(s) (50 mL/Hr) IV Continuous <Continuous>  dextrose 5%. 1000 milliLiter(s) (100 mL/Hr) IV Continuous <Continuous>  dextrose 50% Injectable 25 Gram(s) IV Push once  dextrose 50% Injectable 12.5 Gram(s) IV Push once  dextrose 50% Injectable 25 Gram(s) IV Push once  enalapril 5 milliGRAM(s) Oral daily  glucagon  Injectable 1 milliGRAM(s) IntraMuscular once  insulin lispro (ADMELOG) corrective regimen sliding scale   SubCutaneous three times a day before meals  insulin lispro (ADMELOG) corrective regimen sliding scale   SubCutaneous at bedtime  metFORMIN 1000 milliGRAM(s) Oral two times a day  mirtazapine 30 milliGRAM(s) Oral at bedtime  mometasone 220 MICROgram(s) Inhaler 1 Puff(s) Inhalation two times a day    MEDICATIONS  (PRN):  ALBUTerol    90 MICROgram(s) HFA Inhaler 2 Puff(s) Inhalation every 6 hours PRN Shortness of Breath and/or Wheezing  diphenhydrAMINE 25 milliGRAM(s) Oral once PRN Insomnia  loperamide 2 milliGRAM(s) Oral daily PRN diarrhea  LORazepam     Tablet 0.5 milliGRAM(s) Oral every 6 hours PRN anxiety      VITALS:  T(F): 98.9 (12-08-21 @ 17:15), Max: 98.9 (21 @ 17:15)  HR: 105 (21 @ 14:09) (70 - 105)  BP: 137/78 (21 @ 14:09) (125/60 - 151/71)  RR: 16 (21 @ 17:15) (15 - 18)  SpO2: 99% (21 @ 17:15) (97% - 100%)  Wt(kg): --    I&O's Summary      CAPILLARY BLOOD GLUCOSE      POCT Blood Glucose.: 163 mg/dL (08 Dec 2021 08:56)  POCT Blood Glucose.: 107 mg/dL (07 Dec 2021 22:50)      PHYSICAL EXAM:    HEENT:  pupils equal and reactive, EOMI, no oropharyngeal lesions, erythema, exudates, oral thrush  NECK:   supple, no carotid bruits, no palpable lymph nodes, no thyromegaly  CV:  +S1, +S2, regular, no murmurs or rubs  RESP:   lungs clear to auscultation bilaterally, no wheezing, rales, rhonchi, good air entry bilaterally  BREAST:  not examined  GI:  abdomen soft, non-tender, non-distended, normal BS, no bruits, no abdominal masses, no palpable masses  RECTAL:  not examined  :  not examined  MSK:   normal muscle tone, no atrophy, no rigidity, no contractions  EXT:  no clubbing, no cyanosis, no edema, no calf pain, swelling or erythema  VASCULAR:  pulses equal and symmetric in the upper and lower extremities  NEURO:  AAOX3, no focal neurological deficits, follows all commands, able to move extremities spontaneously  SKIN:  no ulcers, lesions or rashes    LABS:                            11.9   10.23 )-----------( 291      ( 07 Dec 2021 15:18 )             38.0     12-07    139  |  108  |  16  ----------------------------<  167<H>  4.0   |  24  |  0.98    Ca    9.2      07 Dec 2021 15:18              Urinalysis Basic - ( 07 Dec 2021 17:31 )    Color: Yellow / Appearance: Clear / S.015 / pH: x  Gluc: x / Ketone: Trace  / Bili: Negative / Urobili: Negative mg/dL   Blood: x / Protein: Negative mg/dL / Nitrite: Negative   Leuk Esterase: Negative / RBC: x / WBC x   Sq Epi: x / Non Sq Epi: x / Bacteria: x                                    EKG:     RADIOLOGY STUDIES:      IMPRESSION:        RECOMMENDATIONS:        d/w patient, ED RN and ED physician    Time Spent:     HOSPITALIST PA CONSULT NOTE     ADMITTING DIAGNOSIS: Worsening Depression/ Anxiety with Suicidal Ideation     HISTORY OF THE PRESENT ILLNESS: Patient is a 70 y/o F with pmhx of DM2, HTN, HLD, ashtma, gastritis. With pphx of MDD, anxiety, 2 prior psych hospitalizations in . Patient presents to ED with worsening depression and anxiety for several months, was urged to present to ED by her sister.  Patient states that she had been doing well, stable on long term medication regimen of remeron, buspar, until august when she found out that she was up for senior housing and also that she would have to leave one of her jobs. She states that at that point she started feeling more stressed and anxious, with worsening GI discomfort, which is where she states that she holds her stress. In october she had a routine checkup and was told that she was anemic, referred to GI doctor and had a scope 3-4 weeks ago. Since then more anxiety symptoms progressing. When questioned, patient stated she had thoughts of overdosing last week, but no plans were made to carry it out.     Patient seen in the treatment supervised by MA. Patient is very anxious and has concerns over not being maciel     PAST MEDICAL HISTORY:    PAST SURGICAL HISTORY:    FAMILY HISTORY:   non-contributory to the patient's current presentation    SOCIAL HISTORY:  no smoking, no alcohol, no drugs    REVIEW OF SYSTEMS:   All 10 systems reviewed in detailed and found to be negative with the exception of what has already been described above    MEDICATIONS  (STANDING):  ALPRAZolam 0.25 milliGRAM(s) Oral at bedtime  atorvastatin 10 milliGRAM(s) Oral at bedtime  busPIRone 15 milliGRAM(s) Oral three times a day  dextrose 40% Gel 15 Gram(s) Oral once  dextrose 5%. 1000 milliLiter(s) (50 mL/Hr) IV Continuous <Continuous>  dextrose 5%. 1000 milliLiter(s) (100 mL/Hr) IV Continuous <Continuous>  dextrose 50% Injectable 25 Gram(s) IV Push once  dextrose 50% Injectable 12.5 Gram(s) IV Push once  dextrose 50% Injectable 25 Gram(s) IV Push once  enalapril 5 milliGRAM(s) Oral daily  glucagon  Injectable 1 milliGRAM(s) IntraMuscular once  insulin lispro (ADMELOG) corrective regimen sliding scale   SubCutaneous three times a day before meals  insulin lispro (ADMELOG) corrective regimen sliding scale   SubCutaneous at bedtime  metFORMIN 1000 milliGRAM(s) Oral two times a day  mirtazapine 30 milliGRAM(s) Oral at bedtime  mometasone 220 MICROgram(s) Inhaler 1 Puff(s) Inhalation two times a day    MEDICATIONS  (PRN):  ALBUTerol    90 MICROgram(s) HFA Inhaler 2 Puff(s) Inhalation every 6 hours PRN Shortness of Breath and/or Wheezing  diphenhydrAMINE 25 milliGRAM(s) Oral once PRN Insomnia  loperamide 2 milliGRAM(s) Oral daily PRN diarrhea  LORazepam     Tablet 0.5 milliGRAM(s) Oral every 6 hours PRN anxiety      VITALS:  T(F): 98.9 (21 @ 17:15), Max: 98.9 (21 @ 17:15)  HR: 105 (21 @ 14:09) (70 - 105)  BP: 137/78 (21 @ 14:09) (125/60 - 151/71)  RR: 16 (21 @ 17:15) (15 - 18)  SpO2: 99% (21 @ 17:15) (97% - 100%)  Wt(kg): --    I&O's Summary      CAPILLARY BLOOD GLUCOSE      POCT Blood Glucose.: 163 mg/dL (08 Dec 2021 08:56)  POCT Blood Glucose.: 107 mg/dL (07 Dec 2021 22:50)      PHYSICAL EXAM:    HEENT:  pupils equal and reactive, EOMI, no oropharyngeal lesions, erythema, exudates, oral thrush  NECK:   supple, no carotid bruits, no palpable lymph nodes, no thyromegaly  CV:  +S1, +S2, regular, no murmurs or rubs  RESP:   lungs clear to auscultation bilaterally, no wheezing, rales, rhonchi, good air entry bilaterally  BREAST:  not examined  GI:  abdomen soft, non-tender, non-distended, normal BS, no bruits, no abdominal masses, no palpable masses  RECTAL:  not examined  :  not examined  MSK:   normal muscle tone, no atrophy, no rigidity, no contractions  EXT:  no clubbing, no cyanosis, no edema, no calf pain, swelling or erythema  VASCULAR:  pulses equal and symmetric in the upper and lower extremities  NEURO:  AAOX3, no focal neurological deficits, follows all commands, able to move extremities spontaneously  SKIN:  no ulcers, lesions or rashes    LABS:                            11.9   10.23 )-----------( 291      ( 07 Dec 2021 15:18 )             38.0     12    139  |  108  |  16  ----------------------------<  167<H>  4.0   |  24  |  0.98    Ca    9.2      07 Dec 2021 15:18              Urinalysis Basic - ( 07 Dec 2021 17:31 )    Color: Yellow / Appearance: Clear / S.015 / pH: x  Gluc: x / Ketone: Trace  / Bili: Negative / Urobili: Negative mg/dL   Blood: x / Protein: Negative mg/dL / Nitrite: Negative   Leuk Esterase: Negative / RBC: x / WBC x   Sq Epi: x / Non Sq Epi: x / Bacteria: x                                    EKG:     RADIOLOGY STUDIES:      IMPRESSION:        RECOMMENDATIONS:        d/w patient, ED RN and ED physician    Time Spent:     HOSPITALIST PA CONSULT NOTE     ADMITTING DIAGNOSIS: Worsening Depression/ Anxiety with Suicidal Ideation     HISTORY OF THE PRESENT ILLNESS: Patient is a 72 y/o F with pmhx of DM2, HTN, HLD, ashtma, gastritis. With pphx of MDD, anxiety, 2 prior psych hospitalizations in . Patient presents to ED with worsening depression and anxiety for several months, was urged to present to ED by her sister.  Patient states that she had been doing well, stable on long term medication regimen of remeron, buspar, until august when she found out that she was up for senior housing and also that she would have to leave one of her jobs. She states that at that point she started feeling more stressed and anxious, with worsening GI discomfort, which is where she states that she holds her stress. In october she had a routine checkup and was told that she was anemic, referred to GI doctor and had a scope 3-4 weeks ago. Since then more anxiety symptoms progressing. When questioned, patient stated she had thoughts of overdosing last week, but no plans were made to carry it out.     Patient seen in the treatment supervised by MA. Patient is very anxious and has concerns over not being able to sleep well. Also states she has intermittent diarrhea for the past few months usually brought on by her anxiety. She admits to occasional palpitations when she is anxious, not currently though. Denies any other active physical complaints at this time.        PAST MEDICAL & SURGICAL HISTORY:  Asthma, unspecified asthma severity, unspecified whether complicated, unspecified whether persistent  HTN  DM2  HLD  MDD   Anxiety     PAST SURGICAL HISTORY:  Deviated septum repair   Tonsillectomy  Hysterectomy   Cholecystectomy      FAMILY HISTORY:   non-contributory to the patient's current presentation    SOCIAL HISTORY:  no smoking, no alcohol, no drugs    REVIEW OF SYSTEMS:    CONSTITUTIONAL: No weakness, fevers or chills  EYES/ENT: No visual changes;  No vertigo or throat pain   NECK: No pain or stiffness  RESPIRATORY: No cough, wheezing, hemoptysis; No shortness of breath  CARDIOVASCULAR: No current chest pain or palpitations  GASTROINTESTINAL: No current abdominal or epigastric pain. No nausea, vomiting. Occasional diarrhea for the past few months  GENITOURINARY: No dysuria, frequency or hematuria  NEUROLOGICAL: No numbness or weakness  SKIN: No itching, burning, rashes, or lesions   All other review of systems is negative unless indicated above.    MEDICATIONS  (STANDING):  ALPRAZolam 0.25 milliGRAM(s) Oral at bedtime  atorvastatin 10 milliGRAM(s) Oral at bedtime  busPIRone 15 milliGRAM(s) Oral three times a day  dextrose 40% Gel 15 Gram(s) Oral once  dextrose 5%. 1000 milliLiter(s) (50 mL/Hr) IV Continuous <Continuous>  dextrose 5%. 1000 milliLiter(s) (100 mL/Hr) IV Continuous <Continuous>  dextrose 50% Injectable 25 Gram(s) IV Push once  dextrose 50% Injectable 12.5 Gram(s) IV Push once  dextrose 50% Injectable 25 Gram(s) IV Push once  enalapril 5 milliGRAM(s) Oral daily  glucagon  Injectable 1 milliGRAM(s) IntraMuscular once  insulin lispro (ADMELOG) corrective regimen sliding scale   SubCutaneous three times a day before meals  insulin lispro (ADMELOG) corrective regimen sliding scale   SubCutaneous at bedtime  metFORMIN 1000 milliGRAM(s) Oral two times a day  mirtazapine 30 milliGRAM(s) Oral at bedtime  mometasone 220 MICROgram(s) Inhaler 1 Puff(s) Inhalation two times a day    MEDICATIONS  (PRN):  ALBUTerol    90 MICROgram(s) HFA Inhaler 2 Puff(s) Inhalation every 6 hours PRN Shortness of Breath and/or Wheezing  diphenhydrAMINE 25 milliGRAM(s) Oral once PRN Insomnia  loperamide 2 milliGRAM(s) Oral daily PRN diarrhea  LORazepam     Tablet 0.5 milliGRAM(s) Oral every 6 hours PRN anxiety      VITALS:  T(F): 98.9 (21 @ 17:15), Max: 98.9 (21 @ 17:15)  HR: 105 (21 @ 14:09) (70 - 105)  BP: 137/78 (21 @ 14:09) (125/60 - 151/71)  RR: 16 (21 @ 17:15) (15 - 18)  SpO2: 99% (21 @ 17:15) (97% - 100%)  Wt(kg): --    POCT Blood Glucose.: 163 mg/dL (08 Dec 2021 08:56)  POCT Blood Glucose.: 107 mg/dL (07 Dec 2021 22:50)      PHYSICAL EXAM:  GEN: Patient is awake and alert, well developed, well nourished  PSYCH: Patient is moderately anxious   HEENT:  pupils equal and reactive, EOMI, sclera is white. Dentures in place no oropharyngeal lesions, erythema, exudates, oral thrush  NECK:   supple, no palpable lymph nodes  CV:  +S1, +S2, regular, no murmurs or rubs  RESP:   lungs clear to auscultation bilaterally, no wheezing, rales, rhonchi, good air entry bilaterally. Breathing with normal effort. No signs of respiratory distress.   GI:  abdomen soft, non-tender, no abdominal masses, no palpable masses  MSK:   normal muscle tone, no atrophy, no rigidity, no contractions, able to move all four extremities without difficulty   EXT:  no clubbing, no cyanosis, no edema, no calf pain, swelling or erythema  VASCULAR:  pulses equal and symmetric in the upper and lower extremities  NEURO:  AAOX3, no focal neurological deficits, follows all commands, able to move extremities spontaneously, speaking in full coherent sentences. Answers questions appropriately. No slurred speech. Normal gait.   SKIN:  no ulcers, lesions or rashes. Skin is warm, moist and intact. Normal cap refill < 2 seconds     LABS:                          11.9   10.23 )-----------( 291      ( 07 Dec 2021 15:18 )             38.0     12-07    139  |  108  |  16  ----------------------------<  167<H>  4.0   |  24  |  0.98    Ca    9.2      07 Dec 2021 15:18              Urinalysis Basic - ( 07 Dec 2021 17:31 )    Color: Yellow / Appearance: Clear / S.015 / pH: x  Gluc: x / Ketone: Trace  / Bili: Negative / Urobili: Negative mg/dL   Blood: x / Protein: Negative mg/dL / Nitrite: Negative   Leuk Esterase: Negative / RBC: x / WBC x   Sq Epi: x / Non Sq Epi: x / Bacteria: x        EKG: NSR @ 81 bpm   Qt/ Qtc: 362/ 420 ms

## 2021-12-08 NOTE — PROGRESS NOTE BEHAVIORAL HEALTH - NSBHCHARTREVIEWVS_PSY_A_CORE FT
Vital Signs Last 24 Hrs  T(C): 36.8 (08 Dec 2021 11:27), Max: 36.9 (07 Dec 2021 22:48)  T(F): 98.2 (08 Dec 2021 11:27), Max: 98.5 (07 Dec 2021 22:48)  HR: 89 (08 Dec 2021 11:27) (70 - 100)  BP: 142/69 (08 Dec 2021 11:27) (125/60 - 151/71)  BP(mean): 87 (08 Dec 2021 11:27) (87 - 102)  RR: 18 (08 Dec 2021 11:27) (15 - 18)  SpO2: 97% (08 Dec 2021 11:27) (97% - 100%)

## 2021-12-08 NOTE — ED ADULT NURSE REASSESSMENT NOTE - NS ED NURSE REASSESS COMMENT FT1
71 year old female, awake and responsive to all, receive at the beginning of shift, pt denies any pain or SOB, stated she could not get any sleep and still feeling anxious. no acute distress note. comfort measure in place close monitoring continue.

## 2021-12-08 NOTE — ED ADULT NURSE REASSESSMENT NOTE - NS ED NURSE REASSESS COMMENT FT1
Pt resting comfortably in bed, denies any pain/ symptoms at this time, vital signs stable, will reassess.

## 2021-12-08 NOTE — CONSULT NOTE ADULT - ATTENDING COMMENTS
72 y/o F PMHx as noted above admitted to inpatient Psychiatry for further evaluation and management of worsening depression and anxiety with passive suicidal ideation.    #Depression/Anxiety/SI  ~cont. management per Psychiatry  ~cont. medications as above     #Asthma  ~as above patient is currently asymptomatic  ~cont. Asmanex and Proventil inhaler prn for SOB and wheezing   ~monitor O2 saturation     #Diabetes mellitus type 2/Hypertension/Hyperlipidemia  ~Metformin was resumed   ~cont. Insulin SS with correctional Insulin prn   ~Diabetic Diet   ~A1c in AM   ~cont. home medications for HTN and HLD     #Vte ppx  ~as above patient is ambulatory, pharmacologic prophylaxis not warranted at this time. Encourage frequent ambulation

## 2021-12-08 NOTE — ED ADULT NURSE REASSESSMENT NOTE - GENERAL PATIENT STATE
comfortable appearance/cooperative/improvement verbalized/resting/sleeping/smiling/interactive
comfortable appearance/cooperative/improvement verbalized/no change observed/resting/sleeping/smiling/interactive

## 2021-12-09 LAB
A1C WITH ESTIMATED AVERAGE GLUCOSE RESULT: 6.3 % — HIGH (ref 4–5.6)
CHOLEST SERPL-MCNC: 110 MG/DL — SIGNIFICANT CHANGE UP
ESTIMATED AVERAGE GLUCOSE: 134 MG/DL — HIGH (ref 68–114)
HDLC SERPL-MCNC: 46 MG/DL — LOW
LIPID PNL WITH DIRECT LDL SERPL: 44 MG/DL — SIGNIFICANT CHANGE UP
NON HDL CHOLESTEROL: 64 MG/DL — SIGNIFICANT CHANGE UP
TRIGL SERPL-MCNC: 101 MG/DL — SIGNIFICANT CHANGE UP
TSH SERPL-MCNC: 0.92 UU/ML — SIGNIFICANT CHANGE UP (ref 0.34–4.82)

## 2021-12-09 PROCEDURE — 99223 1ST HOSP IP/OBS HIGH 75: CPT

## 2021-12-09 RX ORDER — MIRTAZAPINE 45 MG/1
45 TABLET, ORALLY DISINTEGRATING ORAL AT BEDTIME
Refills: 0 | Status: DISCONTINUED | OUTPATIENT
Start: 2021-12-09 | End: 2021-12-10

## 2021-12-09 RX ADMIN — Medication 0.25 MILLIGRAM(S): at 21:10

## 2021-12-09 RX ADMIN — Medication 5 MILLIGRAM(S): at 13:13

## 2021-12-09 RX ADMIN — Medication 15 MILLIGRAM(S): at 13:13

## 2021-12-09 RX ADMIN — METFORMIN HYDROCHLORIDE 1000 MILLIGRAM(S): 850 TABLET ORAL at 09:20

## 2021-12-09 RX ADMIN — MIRTAZAPINE 45 MILLIGRAM(S): 45 TABLET, ORALLY DISINTEGRATING ORAL at 21:10

## 2021-12-09 RX ADMIN — Medication 0.5 MILLIGRAM(S): at 22:37

## 2021-12-09 RX ADMIN — Medication 15 MILLIGRAM(S): at 09:20

## 2021-12-09 RX ADMIN — ATORVASTATIN CALCIUM 10 MILLIGRAM(S): 80 TABLET, FILM COATED ORAL at 21:10

## 2021-12-09 RX ADMIN — METFORMIN HYDROCHLORIDE 1000 MILLIGRAM(S): 850 TABLET ORAL at 21:10

## 2021-12-09 RX ADMIN — MOMETASONE FUROATE 1 PUFF(S): 220 INHALANT RESPIRATORY (INHALATION) at 08:39

## 2021-12-09 RX ADMIN — Medication 15 MILLIGRAM(S): at 21:10

## 2021-12-09 RX ADMIN — Medication 0.5 MILLIGRAM(S): at 09:20

## 2021-12-09 NOTE — PROGRESS NOTE BEHAVIORAL HEALTH - NSBHFUPIPCHARTREVFT_PSY_A_CORE
71 yr old single  employed  female cc of increased depression, low mood, anhedonia, insomnia (6h per night vs 8 when baseline), fatigue, difficulty concentrating, racing thoughts.  for the last two weeks and also of increased suicidal ideation and plan of OD with pills.  Pt denies any intent and was believing that the stress of recent relocation has increased these thoughts. .  Pt is working 2 jobs  Pt found out that she is up for senior housing and also that she would have to leave one of her jobs.  Pt bib self after she had asked her sister  about the need to go to the hosp

## 2021-12-09 NOTE — PROGRESS NOTE BEHAVIORAL HEALTH - NSBHFUPINTERVALHXFT_PSY_A_CORE
Pt with good eye contact Mood is still depressed .  Pt denies any intent  but still with some ideation of passive type at this time.   Pt with use of remeron with no side effects will ing to get the remeron increased to 45mg hs

## 2021-12-09 NOTE — PROGRESS NOTE BEHAVIORAL HEALTH - NSBHCHARTREVIEWVS_PSY_A_CORE FT
Vital Signs Last 24 Hrs  T(C): 36.1 (09 Dec 2021 07:29), Max: 36.1 (09 Dec 2021 07:29)  T(F): 97 (09 Dec 2021 07:29), Max: 97 (09 Dec 2021 07:29)  HR: --  BP: --  BP(mean): --  RR: 18 (09 Dec 2021 07:29) (18 - 18)  SpO2: 99% (09 Dec 2021 07:29) (99% - 99%)

## 2021-12-09 NOTE — PROGRESS NOTE BEHAVIORAL HEALTH - RISK ASSESSMENT
low risk  depression , anxiety , still with occasional ideation   mitigating : pt denies any intent   protective pt is employed, has a stable home, has social support   chronic : prior psych hosp

## 2021-12-09 NOTE — BH SAFETY PLAN - THE ONE THING THAT IS MOST IMPORTANT TO ME AND WORTH LIVING FOR IS:
My niece and family. My niece, nephew, my sister and friend [facility].

## 2021-12-09 NOTE — BH SAFETY PLAN - STEP 6 SAFE ENVIRONMENT
To feel better and less anxious.  I want to get my anxiety under control.  It's always been a problem.

## 2021-12-09 NOTE — PROGRESS NOTE BEHAVIORAL HEALTH - NSBHADDHXPSYCHFT_PSY_A_CORE
She was hospitalized psychiatrically 30 years ago.  2 prior hospitalizations in 1980s, currently in outpatient care,

## 2021-12-10 DIAGNOSIS — F31.9 BIPOLAR DISORDER, UNSPECIFIED: ICD-10-CM

## 2021-12-10 DIAGNOSIS — F41.1 GENERALIZED ANXIETY DISORDER: ICD-10-CM

## 2021-12-10 PROCEDURE — 99232 SBSQ HOSP IP/OBS MODERATE 35: CPT

## 2021-12-10 RX ORDER — DIVALPROEX SODIUM 500 MG/1
250 TABLET, DELAYED RELEASE ORAL AT BEDTIME
Refills: 0 | Status: DISCONTINUED | OUTPATIENT
Start: 2021-12-10 | End: 2021-12-13

## 2021-12-10 RX ORDER — MIRTAZAPINE 45 MG/1
15 TABLET, ORALLY DISINTEGRATING ORAL AT BEDTIME
Refills: 0 | Status: DISCONTINUED | OUTPATIENT
Start: 2021-12-10 | End: 2021-12-12

## 2021-12-10 RX ORDER — DIVALPROEX SODIUM 500 MG/1
250 TABLET, DELAYED RELEASE ORAL DAILY
Refills: 0 | Status: DISCONTINUED | OUTPATIENT
Start: 2021-12-10 | End: 2021-12-13

## 2021-12-10 RX ADMIN — MOMETASONE FUROATE 1 PUFF(S): 220 INHALANT RESPIRATORY (INHALATION) at 08:35

## 2021-12-10 RX ADMIN — Medication 0.5 MILLIGRAM(S): at 22:34

## 2021-12-10 RX ADMIN — METFORMIN HYDROCHLORIDE 1000 MILLIGRAM(S): 850 TABLET ORAL at 20:28

## 2021-12-10 RX ADMIN — Medication 5 MILLIGRAM(S): at 13:25

## 2021-12-10 RX ADMIN — Medication 15 MILLIGRAM(S): at 09:35

## 2021-12-10 RX ADMIN — MIRTAZAPINE 15 MILLIGRAM(S): 45 TABLET, ORALLY DISINTEGRATING ORAL at 20:28

## 2021-12-10 RX ADMIN — Medication 0.5 MILLIGRAM(S): at 09:38

## 2021-12-10 RX ADMIN — DIVALPROEX SODIUM 250 MILLIGRAM(S): 500 TABLET, DELAYED RELEASE ORAL at 13:25

## 2021-12-10 RX ADMIN — ATORVASTATIN CALCIUM 10 MILLIGRAM(S): 80 TABLET, FILM COATED ORAL at 20:29

## 2021-12-10 RX ADMIN — METFORMIN HYDROCHLORIDE 1000 MILLIGRAM(S): 850 TABLET ORAL at 09:35

## 2021-12-10 RX ADMIN — DIVALPROEX SODIUM 250 MILLIGRAM(S): 500 TABLET, DELAYED RELEASE ORAL at 20:28

## 2021-12-10 RX ADMIN — Medication 5 MILLIGRAM(S): at 20:29

## 2021-12-10 NOTE — PROGRESS NOTE BEHAVIORAL HEALTH - RISK ASSESSMENT
moderate with depression anxiety , suicidal ideation   mitigating denies any intent   protective place to live, employed   chronic prior psych admission

## 2021-12-10 NOTE — PROGRESS NOTE BEHAVIORAL HEALTH - NSBHCHARTREVIEWVS_PSY_A_CORE FT
Vital Signs Last 24 Hrs  T(C): 36.6 (10 Dec 2021 09:14), Max: 36.6 (10 Dec 2021 09:14)  T(F): 97.9 (10 Dec 2021 09:14), Max: 97.9 (10 Dec 2021 09:14)  HR: --  BP: --  BP(mean): --  RR: 12 (10 Dec 2021 09:14) (12 - 12)  SpO2: 99% (10 Dec 2021 09:14) (99% - 99%)

## 2021-12-10 NOTE — PROGRESS NOTE BEHAVIORAL HEALTH - NSBHFUPINTERVALHXFT_PSY_A_CORE
Pt complaining of having difficulty with sleep "can't turn my mind down , too many thoughts, , I get restless and anxious. Pt spoke about past episode of being on steroids and "getting very anxious and panicky" Pt claiming that she is finding that "ativan and xanax work the best for me, even ambien." She spoke of "always working more than one job, Pt complaining of having difficulty with sleep "can't turn my mind down , too many thoughts, , I get restless and anxious. Pt spoke about past episode of being on steroids and "getting very anxious and panicky" Pt claiming that she is finding that "ativan and xanax work the best for me, even ambien." She spoke of "always working more than one job,  Pt with tendency to prefer benzodiazepine and had in the past some tendency to take too many.

## 2021-12-11 PROCEDURE — 99231 SBSQ HOSP IP/OBS SF/LOW 25: CPT

## 2021-12-11 RX ORDER — ZOLPIDEM TARTRATE 10 MG/1
5 TABLET ORAL AT BEDTIME
Refills: 0 | Status: DISCONTINUED | OUTPATIENT
Start: 2021-12-11 | End: 2021-12-13

## 2021-12-11 RX ADMIN — MOMETASONE FUROATE 1 PUFF(S): 220 INHALANT RESPIRATORY (INHALATION) at 20:57

## 2021-12-11 RX ADMIN — DIVALPROEX SODIUM 250 MILLIGRAM(S): 500 TABLET, DELAYED RELEASE ORAL at 13:02

## 2021-12-11 RX ADMIN — Medication 1: at 07:54

## 2021-12-11 RX ADMIN — DIVALPROEX SODIUM 250 MILLIGRAM(S): 500 TABLET, DELAYED RELEASE ORAL at 20:26

## 2021-12-11 RX ADMIN — MOMETASONE FUROATE 1 PUFF(S): 220 INHALANT RESPIRATORY (INHALATION) at 07:56

## 2021-12-11 RX ADMIN — Medication 5 MILLIGRAM(S): at 13:46

## 2021-12-11 RX ADMIN — ATORVASTATIN CALCIUM 10 MILLIGRAM(S): 80 TABLET, FILM COATED ORAL at 20:26

## 2021-12-11 RX ADMIN — Medication 5 MILLIGRAM(S): at 09:36

## 2021-12-11 RX ADMIN — MIRTAZAPINE 15 MILLIGRAM(S): 45 TABLET, ORALLY DISINTEGRATING ORAL at 20:26

## 2021-12-11 RX ADMIN — METFORMIN HYDROCHLORIDE 1000 MILLIGRAM(S): 850 TABLET ORAL at 09:35

## 2021-12-11 RX ADMIN — ZOLPIDEM TARTRATE 5 MILLIGRAM(S): 10 TABLET ORAL at 20:26

## 2021-12-11 RX ADMIN — METFORMIN HYDROCHLORIDE 1000 MILLIGRAM(S): 850 TABLET ORAL at 20:24

## 2021-12-11 RX ADMIN — Medication 0.5 MILLIGRAM(S): at 09:36

## 2021-12-11 RX ADMIN — Medication 5 MILLIGRAM(S): at 20:26

## 2021-12-11 NOTE — PROGRESS NOTE BEHAVIORAL HEALTH - NSBHCHARTREVIEWVS_PSY_A_CORE FT
Vital Signs Last 24 Hrs  T(C): 36.8 (11 Dec 2021 07:44), Max: 36.8 (11 Dec 2021 07:44)  T(F): 98.2 (11 Dec 2021 07:44), Max: 98.2 (11 Dec 2021 07:44)  HR: --  BP: --  BP(mean): --  RR: 16 (11 Dec 2021 07:44) (16 - 16)  SpO2: 98% (11 Dec 2021 07:44) (98% - 98%)

## 2021-12-11 NOTE — PROGRESS NOTE BEHAVIORAL HEALTH - NSBHFUPINTERVALHXFT_PSY_A_CORE
Pt complaining of having difficulty with sleep "can't turn my mind down , too many thoughts, , I get restless and anxious. Pt spoke about past episode of being on steroids and "getting very anxious and panicky" Pt claiming that she is finding that "ativan and xanax work the best for me, even Ambien." She spoke of "always working more than one job,  Pt with tendency to prefer benzodiazepine and had in the past some tendency to take too many.    12/11/2021: Patient was seen today AM, chart reviewed and discussed in team. She endorses that she was hospitalized 2 times in 1984 at [facility] and also received ECT for mood (Depression). 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 She continues to have sleep issues, was not able to sleep last night , takes Ambien for sleep at times. She did not see her BF, but spoke with him since the incident when she was given a Cannabinoid Gummy Ball which caused her severe Panic attack by her BF. She expressed SI while she was in ED, but never tried to commit suicide and not Suicidal now. She added that she was on Buspar 45mg/day in the past, current dosage is 5 mg BID.    Plan: Ambien 5 mg HS ordered for sleep          To continue other meds as ordered.         F/U in AM

## 2021-12-12 PROCEDURE — 99231 SBSQ HOSP IP/OBS SF/LOW 25: CPT

## 2021-12-12 RX ORDER — MIRTAZAPINE 45 MG/1
30 TABLET, ORALLY DISINTEGRATING ORAL AT BEDTIME
Refills: 0 | Status: DISCONTINUED | OUTPATIENT
Start: 2021-12-12 | End: 2021-12-21

## 2021-12-12 RX ORDER — ZOLPIDEM TARTRATE 10 MG/1
5 TABLET ORAL AT BEDTIME
Refills: 0 | Status: DISCONTINUED | OUTPATIENT
Start: 2021-12-12 | End: 2021-12-15

## 2021-12-12 RX ADMIN — METFORMIN HYDROCHLORIDE 1000 MILLIGRAM(S): 850 TABLET ORAL at 20:13

## 2021-12-12 RX ADMIN — ZOLPIDEM TARTRATE 5 MILLIGRAM(S): 10 TABLET ORAL at 20:16

## 2021-12-12 RX ADMIN — DIVALPROEX SODIUM 250 MILLIGRAM(S): 500 TABLET, DELAYED RELEASE ORAL at 20:16

## 2021-12-12 RX ADMIN — Medication 0.5 MILLIGRAM(S): at 09:18

## 2021-12-12 RX ADMIN — Medication 1: at 07:39

## 2021-12-12 RX ADMIN — Medication 0.5 MILLIGRAM(S): at 20:18

## 2021-12-12 RX ADMIN — MOMETASONE FUROATE 1 PUFF(S): 220 INHALANT RESPIRATORY (INHALATION) at 21:18

## 2021-12-12 RX ADMIN — MOMETASONE FUROATE 1 PUFF(S): 220 INHALANT RESPIRATORY (INHALATION) at 09:58

## 2021-12-12 RX ADMIN — MIRTAZAPINE 30 MILLIGRAM(S): 45 TABLET, ORALLY DISINTEGRATING ORAL at 20:16

## 2021-12-12 RX ADMIN — Medication 5 MILLIGRAM(S): at 09:15

## 2021-12-12 RX ADMIN — Medication 10 MILLIGRAM(S): at 20:16

## 2021-12-12 RX ADMIN — DIVALPROEX SODIUM 250 MILLIGRAM(S): 500 TABLET, DELAYED RELEASE ORAL at 14:28

## 2021-12-12 RX ADMIN — METFORMIN HYDROCHLORIDE 1000 MILLIGRAM(S): 850 TABLET ORAL at 09:15

## 2021-12-12 RX ADMIN — ATORVASTATIN CALCIUM 10 MILLIGRAM(S): 80 TABLET, FILM COATED ORAL at 20:16

## 2021-12-12 NOTE — PROGRESS NOTE BEHAVIORAL HEALTH - NSBHFUPINTERVALHXFT_PSY_A_CORE
Pt complaining of having difficulty with sleep "can't turn my mind down , too many thoughts, , I get restless and anxious. Pt spoke about past episode of being on steroids and "getting very anxious and panicky" Pt claiming that she is finding that "ativan and xanax work the best for me, even Ambien." She spoke of "always working more than one job,  Pt with tendency to prefer benzodiazepine and had in the past some tendency to take too many.    12/11/2021: Patient was seen today AM, chart reviewed and discussed in team. She endorses that she was hospitalized 2 times in 1984 at [facility] and also received ECT for mood (Depression). 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 She continues to have sleep issues, was not able to sleep last night , takes Ambien for sleep at times. She did not see her BF, but spoke with him since the incident when she was given a Cannabinoid Gummy Ball which caused her severe Panic attack by her BF. She expressed SI while she was in ED, but never tried to commit suicide and not Suicidal now. She added that she was on Buspar 45mg/day in the past, current dosage is 5 mg BID.    Plan: Ambien 5 mg HS ordered for sleep          To continue other meds as ordered.         F/U in AM    12/12/2021: Patient was seen today AM, chart reviewed and discussed in team. She endorses that she was not able to sleep even with Ambien 5 mg and now Ambien was further increased to Ambien 5 mg HS with additional Ambien 5 mg PRN as needed for sleep. Remeron was also increased to Remeron 30 mg HS as she was on Remeron 30 mg HS in the past. No significant changes noted, to continue with other meds as planned. Writer aware that she wanted to have increased dosage of Benzo e.g. Ativan.

## 2021-12-12 NOTE — PROGRESS NOTE BEHAVIORAL HEALTH - NSBHCHARTREVIEWVS_PSY_A_CORE FT
Vital Signs Last 24 Hrs  T(C): 36.8 (12 Dec 2021 07:55), Max: 36.8 (12 Dec 2021 07:55)  T(F): 98.2 (12 Dec 2021 07:55), Max: 98.2 (12 Dec 2021 07:55)  HR: --  BP: --  BP(mean): --  RR: 16 (12 Dec 2021 07:55) (16 - 16)  SpO2: 98% (12 Dec 2021 07:55) (98% - 98%)

## 2021-12-13 PROCEDURE — 99232 SBSQ HOSP IP/OBS MODERATE 35: CPT

## 2021-12-13 RX ORDER — DIVALPROEX SODIUM 500 MG/1
500 TABLET, DELAYED RELEASE ORAL AT BEDTIME
Refills: 0 | Status: DISCONTINUED | OUTPATIENT
Start: 2021-12-14 | End: 2021-12-21

## 2021-12-13 RX ORDER — DIVALPROEX SODIUM 500 MG/1
250 TABLET, DELAYED RELEASE ORAL AT BEDTIME
Refills: 0 | Status: COMPLETED | OUTPATIENT
Start: 2021-12-13 | End: 2021-12-13

## 2021-12-13 RX ORDER — TRAZODONE HCL 50 MG
25 TABLET ORAL AT BEDTIME
Refills: 0 | Status: DISCONTINUED | OUTPATIENT
Start: 2021-12-13 | End: 2021-12-15

## 2021-12-13 RX ADMIN — Medication 10 MILLIGRAM(S): at 09:22

## 2021-12-13 RX ADMIN — ATORVASTATIN CALCIUM 10 MILLIGRAM(S): 80 TABLET, FILM COATED ORAL at 20:29

## 2021-12-13 RX ADMIN — Medication 1: at 08:14

## 2021-12-13 RX ADMIN — MIRTAZAPINE 30 MILLIGRAM(S): 45 TABLET, ORALLY DISINTEGRATING ORAL at 20:31

## 2021-12-13 RX ADMIN — Medication 10 MILLIGRAM(S): at 20:29

## 2021-12-13 RX ADMIN — METFORMIN HYDROCHLORIDE 1000 MILLIGRAM(S): 850 TABLET ORAL at 09:22

## 2021-12-13 RX ADMIN — Medication 5 MILLIGRAM(S): at 10:50

## 2021-12-13 RX ADMIN — METFORMIN HYDROCHLORIDE 1000 MILLIGRAM(S): 850 TABLET ORAL at 20:30

## 2021-12-13 RX ADMIN — DIVALPROEX SODIUM 250 MILLIGRAM(S): 500 TABLET, DELAYED RELEASE ORAL at 20:29

## 2021-12-13 RX ADMIN — Medication 25 MILLIGRAM(S): at 20:39

## 2021-12-13 RX ADMIN — Medication 0.5 MILLIGRAM(S): at 09:24
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
## 2021-12-14 PROCEDURE — 99232 SBSQ HOSP IP/OBS MODERATE 35: CPT

## 2021-12-14 RX ADMIN — Medication 2 MILLIGRAM(S): at 18:15

## 2021-12-14 RX ADMIN — ATORVASTATIN CALCIUM 10 MILLIGRAM(S): 80 TABLET, FILM COATED ORAL at 20:47

## 2021-12-14 RX ADMIN — Medication 10 MILLIGRAM(S): at 20:46

## 2021-12-14 RX ADMIN — Medication 1: at 07:51

## 2021-12-14 RX ADMIN — MOMETASONE FUROATE 1 PUFF(S): 220 INHALANT RESPIRATORY (INHALATION) at 09:25

## 2021-12-14 RX ADMIN — MIRTAZAPINE 30 MILLIGRAM(S): 45 TABLET, ORALLY DISINTEGRATING ORAL at 20:46

## 2021-12-14 RX ADMIN — Medication 25 MILLIGRAM(S): at 20:46

## 2021-12-14 RX ADMIN — Medication 1: at 12:06

## 2021-12-14 RX ADMIN — Medication 10 MILLIGRAM(S): at 09:22

## 2021-12-14 RX ADMIN — DIVALPROEX SODIUM 500 MILLIGRAM(S): 500 TABLET, DELAYED RELEASE ORAL at 20:47

## 2021-12-14 RX ADMIN — MOMETASONE FUROATE 1 PUFF(S): 220 INHALANT RESPIRATORY (INHALATION) at 21:16

## 2021-12-14 RX ADMIN — Medication 0.5 MILLIGRAM(S): at 20:47

## 2021-12-14 RX ADMIN — Medication 0.5 MILLIGRAM(S): at 09:22

## 2021-12-14 RX ADMIN — METFORMIN HYDROCHLORIDE 1000 MILLIGRAM(S): 850 TABLET ORAL at 09:22

## 2021-12-14 RX ADMIN — ZOLPIDEM TARTRATE 5 MILLIGRAM(S): 10 TABLET ORAL at 20:46

## 2021-12-14 RX ADMIN — METFORMIN HYDROCHLORIDE 1000 MILLIGRAM(S): 850 TABLET ORAL at 20:47

## 2021-12-14 NOTE — PROGRESS NOTE BEHAVIORAL HEALTH - RISK ASSESSMENT
low with decreased depression anxiety , denies  suicidal ideation   mitigating denies any intent   protective place to live, employed   chronic prior psych admission

## 2021-12-14 NOTE — PROGRESS NOTE BEHAVIORAL HEALTH - NSBHFUPINTERVALHXFT_PSY_A_CORE
12/14/2021 Pt with want to retry meds for sleep tonight.  Pt with hx of reliance on sleep medication   12/13/2021 Pt complaining of having difficulty with sleep "can't turn my mind down , too many thoughts, , I get restless and anxious. Pt spoke about past episode of being on steroids and "getting very anxious and panicky" Pt claiming that she is finding that "ativan and xanax work the best for me, even Ambien." She spoke of "always working more than one job,  Pt with tendency to prefer benzodiazepine and had in the past some tendency to take too many.    12/11/2021: Patient was seen today AM, chart reviewed and discussed in team. She endorses that she was hospitalized 2 times in 1984 at [facility] and also received ECT for mood (Depression). 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 She continues to have sleep issues, was not able to sleep last night , takes Ambien for sleep at times. She did not see her BF, but spoke with him since the incident when she was given a Cannabinoid Gummy Ball which caused her severe Panic attack by her BF. She expressed SI while she was in ED, but never tried to commit suicide and not Suicidal now. She added that she was on Buspar 45mg/day in the past, current dosage is 5 mg BID.    Plan: Ambien 5 mg HS ordered for sleep          To continue other meds as ordered.         F/U in AM    12/12/2021: Patient was seen today AM, chart reviewed and discussed in team. She endorses that she was not able to sleep even with Ambien 5 mg and now Ambien was further increased to Ambien 5 mg HS with additional Ambien 5 mg PRN as needed for sleep. Remeron was also increased to Remeron 30 mg HS as she was on Remeron 30 mg HS in the past. No significant changes noted, to continue with other meds as planned. Writer aware that she wanted to have increased dosage of Benzo e.g. Ativan.

## 2021-12-15 PROCEDURE — 99232 SBSQ HOSP IP/OBS MODERATE 35: CPT

## 2021-12-15 RX ORDER — ZOLPIDEM TARTRATE 10 MG/1
5 TABLET ORAL AT BEDTIME
Refills: 0 | Status: DISCONTINUED | OUTPATIENT
Start: 2021-12-15 | End: 2021-12-16

## 2021-12-15 RX ADMIN — Medication 0.5 MILLIGRAM(S): at 21:06

## 2021-12-15 RX ADMIN — MOMETASONE FUROATE 1 PUFF(S): 220 INHALANT RESPIRATORY (INHALATION) at 21:09

## 2021-12-15 RX ADMIN — METFORMIN HYDROCHLORIDE 1000 MILLIGRAM(S): 850 TABLET ORAL at 21:07

## 2021-12-15 RX ADMIN — Medication 0.5 MILLIGRAM(S): at 09:30

## 2021-12-15 RX ADMIN — Medication 1: at 08:27

## 2021-12-15 RX ADMIN — MIRTAZAPINE 30 MILLIGRAM(S): 45 TABLET, ORALLY DISINTEGRATING ORAL at 21:08

## 2021-12-15 RX ADMIN — Medication 10 MILLIGRAM(S): at 21:06

## 2021-12-15 RX ADMIN — Medication 0: at 21:08

## 2021-12-15 RX ADMIN — DIVALPROEX SODIUM 500 MILLIGRAM(S): 500 TABLET, DELAYED RELEASE ORAL at 21:06

## 2021-12-15 RX ADMIN — Medication 5 MILLIGRAM(S): at 10:11

## 2021-12-15 RX ADMIN — Medication 1: at 12:19

## 2021-12-15 RX ADMIN — ZOLPIDEM TARTRATE 5 MILLIGRAM(S): 10 TABLET ORAL at 21:07

## 2021-12-15 RX ADMIN — MOMETASONE FUROATE 1 PUFF(S): 220 INHALANT RESPIRATORY (INHALATION) at 09:22

## 2021-12-15 RX ADMIN — ATORVASTATIN CALCIUM 10 MILLIGRAM(S): 80 TABLET, FILM COATED ORAL at 21:07

## 2021-12-15 RX ADMIN — Medication 1: at 17:55

## 2021-12-15 RX ADMIN — Medication 10 MILLIGRAM(S): at 09:30

## 2021-12-15 RX ADMIN — METFORMIN HYDROCHLORIDE 1000 MILLIGRAM(S): 850 TABLET ORAL at 09:29

## 2021-12-15 NOTE — PROGRESS NOTE BEHAVIORAL HEALTH - NSBHCHARTREVIEWVS_PSY_A_CORE FT
Vital Signs Last 24 Hrs  T(C): 36.1 (15 Dec 2021 07:58), Max: 36.1 (15 Dec 2021 07:58)  T(F): 97 (15 Dec 2021 07:58), Max: 97 (15 Dec 2021 07:58)  HR: --  BP: --  BP(mean): --  RR: 18 (15 Dec 2021 07:58) (18 - 18)  SpO2: 99% (15 Dec 2021 07:58) (99% - 99%)

## 2021-12-15 NOTE — PROGRESS NOTE BEHAVIORAL HEALTH - RISK ASSESSMENT
low with decreased depression anxiety , denies  suicidal ideation , not with any intent or plan   mitigating denies any intent   protective place to live, employed   chronic prior psych admission

## 2021-12-15 NOTE — PROGRESS NOTE BEHAVIORAL HEALTH - NSBHFUPINTERVALHXFT_PSY_A_CORE
12/15/2021 Pt with cc of not sleeping yesterday but the nursing staff indicated tht the pt was asleep at night. Reviewed the potential side effects of medication in particular for ambien but pt insist "nothing else works and I want to be on it as I was on the outside." Pt and her sister were involved in the conversation and both are aware of the potential for oversedation , falls and forgetfulness.  Still the pt and her sister want the pt to be on the ambien inspite of potential for addiction and further trouble with sleep.  Will add the ambien 5mg as standing and the next 5mg is for prn if still with insomnia.

## 2021-12-16 PROCEDURE — 99232 SBSQ HOSP IP/OBS MODERATE 35: CPT

## 2021-12-16 RX ORDER — ZOLPIDEM TARTRATE 10 MG/1
5 TABLET ORAL AT BEDTIME
Refills: 0 | Status: DISCONTINUED | OUTPATIENT
Start: 2021-12-16 | End: 2021-12-17

## 2021-12-16 RX ORDER — TRAZODONE HCL 50 MG
50 TABLET ORAL AT BEDTIME
Refills: 0 | Status: DISCONTINUED | OUTPATIENT
Start: 2021-12-16 | End: 2021-12-17

## 2021-12-16 RX ADMIN — Medication 10 MILLIGRAM(S): at 21:43

## 2021-12-16 RX ADMIN — MIRTAZAPINE 30 MILLIGRAM(S): 45 TABLET, ORALLY DISINTEGRATING ORAL at 21:44

## 2021-12-16 RX ADMIN — METFORMIN HYDROCHLORIDE 1000 MILLIGRAM(S): 850 TABLET ORAL at 09:30

## 2021-12-16 RX ADMIN — ZOLPIDEM TARTRATE 5 MILLIGRAM(S): 10 TABLET ORAL at 21:44

## 2021-12-16 RX ADMIN — Medication 1: at 07:43

## 2021-12-16 RX ADMIN — Medication 5 MILLIGRAM(S): at 09:40

## 2021-12-16 RX ADMIN — METFORMIN HYDROCHLORIDE 1000 MILLIGRAM(S): 850 TABLET ORAL at 21:44

## 2021-12-16 RX ADMIN — MOMETASONE FUROATE 1 PUFF(S): 220 INHALANT RESPIRATORY (INHALATION) at 19:34

## 2021-12-16 RX ADMIN — Medication 50 MILLIGRAM(S): at 21:43

## 2021-12-16 RX ADMIN — Medication 0: at 21:20

## 2021-12-16 RX ADMIN — DIVALPROEX SODIUM 500 MILLIGRAM(S): 500 TABLET, DELAYED RELEASE ORAL at 21:43

## 2021-12-16 RX ADMIN — Medication 10 MILLIGRAM(S): at 09:30

## 2021-12-16 RX ADMIN — ATORVASTATIN CALCIUM 10 MILLIGRAM(S): 80 TABLET, FILM COATED ORAL at 21:44

## 2021-12-16 NOTE — PROGRESS NOTE BEHAVIORAL HEALTH - RISK ASSESSMENT
low with decreased depression anxiety , denies  suicidal ideation , not with any intent or plan  pt claims she is obsessing with the insomnia   mitigating denies any intent   protective place to live, employed   chronic prior psych admission

## 2021-12-16 NOTE — PROGRESS NOTE BEHAVIORAL HEALTH - NSBHFUPINTERVALHXFT_PSY_A_CORE
12/16/2021 Pt still claiming that she is not well enough to go home as she claims "I still cant sleep even with the ambien."  Spoke with both the pt and her sister and pt willing to retry the trazodone but at a higher dose of 50mg , and the ambien at 5mg as the prn.   Pt denies any suicidal ideation intent or plan . Pt still undecided what to do with her plans to stay in apt or not even if the siter offerred to help her out by letting her stay to sleep in her home.   12/15/2021 Pt with cc of not sleeping yesterday but the nursing staff indicated tht the pt was asleep at night. Reviewed the potential side effects of medication in particular for ambien but pt insist "nothing else works and I want to be on it as I was on the outside." Pt and her sister were involved in the conversation and both are aware of the potential for oversedation , falls and forgetfulness.  Still the pt and her sister want the pt to be on the ambien inspite of potential for addiction and further trouble with sleep.  Will add the ambien 5mg as standing and the next 5mg is for prn if still with insomnia.

## 2021-12-16 NOTE — PROGRESS NOTE BEHAVIORAL HEALTH - NSBHCHARTREVIEWVS_PSY_A_CORE FT
Vital Signs Last 24 Hrs  T(C): 36.7 (16 Dec 2021 07:34), Max: 36.7 (16 Dec 2021 07:34)  T(F): 98.1 (16 Dec 2021 07:34), Max: 98.1 (16 Dec 2021 07:34)  HR: --  BP: --  BP(mean): --  RR: 18 (16 Dec 2021 07:34) (18 - 18)  SpO2: 99% (16 Dec 2021 07:34) (99% - 99%)

## 2021-12-17 PROCEDURE — 99232 SBSQ HOSP IP/OBS MODERATE 35: CPT

## 2021-12-17 RX ORDER — TRAZODONE HCL 50 MG
50 TABLET ORAL AT BEDTIME
Refills: 0 | Status: DISCONTINUED | OUTPATIENT
Start: 2021-12-17 | End: 2021-12-21

## 2021-12-17 RX ADMIN — Medication 1: at 18:10

## 2021-12-17 RX ADMIN — MOMETASONE FUROATE 1 PUFF(S): 220 INHALANT RESPIRATORY (INHALATION) at 08:34

## 2021-12-17 RX ADMIN — Medication 1: at 07:32

## 2021-12-17 RX ADMIN — DIVALPROEX SODIUM 500 MILLIGRAM(S): 500 TABLET, DELAYED RELEASE ORAL at 21:28

## 2021-12-17 RX ADMIN — METFORMIN HYDROCHLORIDE 1000 MILLIGRAM(S): 850 TABLET ORAL at 21:27

## 2021-12-17 RX ADMIN — Medication 2 MILLIGRAM(S): at 14:45

## 2021-12-17 RX ADMIN — Medication 1: at 12:18

## 2021-12-17 RX ADMIN — METFORMIN HYDROCHLORIDE 1000 MILLIGRAM(S): 850 TABLET ORAL at 09:27

## 2021-12-17 RX ADMIN — Medication 10 MILLIGRAM(S): at 09:27

## 2021-12-17 RX ADMIN — Medication 5 MILLIGRAM(S): at 12:10

## 2021-12-17 RX ADMIN — MOMETASONE FUROATE 1 PUFF(S): 220 INHALANT RESPIRATORY (INHALATION) at 20:55

## 2021-12-17 RX ADMIN — Medication 10 MILLIGRAM(S): at 21:27

## 2021-12-17 RX ADMIN — MIRTAZAPINE 30 MILLIGRAM(S): 45 TABLET, ORALLY DISINTEGRATING ORAL at 21:27

## 2021-12-17 RX ADMIN — ATORVASTATIN CALCIUM 10 MILLIGRAM(S): 80 TABLET, FILM COATED ORAL at 21:28

## 2021-12-17 RX ADMIN — Medication 0.5 MILLIGRAM(S): at 21:27

## 2021-12-17 RX ADMIN — Medication 50 MILLIGRAM(S): at 21:28

## 2021-12-17 NOTE — PROGRESS NOTE BEHAVIORAL HEALTH - NSBHADMITMEDEDUDETAILS_A_CORE FT
pt educated on the use of the medication and potential side effects

## 2021-12-17 NOTE — PROGRESS NOTE BEHAVIORAL HEALTH - RISK ASSESSMENT
low with decreased depression anxiety , denies  suicidal ideation , not with any intent or plan  pt claims she is obsessing with the insomnia   mitigating denies any intent   protective place to live, employed   chronic prior psych admission low with decreased depression anxiety , denies  suicidal ideation , not with any intent or plan  pt claims she is obsessing with the insomnia , pt wanting xanax   mitigating denies any intent   protective place to live, employed   chronic prior psych admission

## 2021-12-17 NOTE — DISCHARGE NOTE BEHAVIORAL HEALTH - NSBHDCADDR1FT_A_CORE
[address] [place] [address]  Zoom appt with Altagracia MALAGON LCSW for intake. You can go to clinic for appt and staff can assist with setting up zoom.  Meeting ID # [phone]

## 2021-12-17 NOTE — DISCHARGE NOTE BEHAVIORAL HEALTH - NSBHDCSWCOMMENTSFT_PSY_A_CORE
Pt. was educated on the importance of taking medications as prescribed and to not stop or miss any doses unless directed by prescribing provider. Pt. was counseled on the importance of attending outpatient appointments for ongoing development of coping skills in particular. Pt. was made aware of crisis resources to use after hours as needed including returning to the hospital.    Pt. was educated regarding safety precautions for COVID-19 including hand hygiene, wearing a mask, and maintaining social distancing and advised to seek medical attention if experiencing any symptoms. Pt. provided written handout of COVID-19 symptoms and management in discharge packet.

## 2021-12-17 NOTE — DISCHARGE NOTE BEHAVIORAL HEALTH - NS MD DC FALL RISK RISK
For information on Fall & Injury Prevention, visit: [email or web address] OR  [email or web address] OR  https://www.cdc.gov/steadi/patient.html

## 2021-12-17 NOTE — PROGRESS NOTE BEHAVIORAL HEALTH - NSBHFUPINTERVALHXFT_PSY_A_CORE
12/17/2021 Pt still with insomnia , so far tried melatonin, trazodone, ambien . Pt asking for xanax.   12/16/2021 Pt still claiming that she is not well enough to go home as she claims "I still cant sleep even with the ambien."  Spoke with both the pt and her sister and pt willing to retry the trazodone but at a higher dose of 50mg , and the ambien at 5mg as the prn.   Pt denies any suicidal ideation intent or plan . Pt still undecided what to do with her plans to stay in apt or not even if the sister offered to help her out by letting her stay to sleep in her home.   12/15/2021 Pt with cc of not sleeping yesterday but the nursing staff indicated tht the pt was asleep at night. Reviewed the potential side effects of medication in particular for ambien but pt insist "nothing else works and I want to be on it as I was on the outside." Pt and her sister were involved in the conversation and both are aware of the potential for oversedation , falls and forgetfulness.  Still the pt and her sister want the pt to be on the ambien inspite of potential for addiction and further trouble with sleep.  Will add the ambien 5mg as standing and the next 5mg is for prn if still with insomnia. 12/17/2021 Pt still with insomnia , so far tried melatonin, trazodone, ambien . Pt asking for xanax.  Will add ativan 0.5mg  as the medication with no active metabolite  12/16/2021 Pt still claiming that she is not well enough to go home as she claims "I still cant sleep even with the ambien."  Spoke with both the pt and her sister and pt willing to retry the trazodone but at a higher dose of 50mg , and the ambien at 5mg as the prn.   Pt denies any suicidal ideation intent or plan . Pt still undecided what to do with her plans to stay in apt or not even if the sister offered to help her out by letting her stay to sleep in her home.   12/15/2021 Pt with cc of not sleeping yesterday but the nursing staff indicated tht the pt was asleep at night. Reviewed the potential side effects of medication in particular for ambien but pt insist "nothing else works and I want to be on it as I was on the outside." Pt and her sister were involved in the conversation and both are aware of the potential for oversedation , falls and forgetfulness.  Still the pt and her sister want the pt to be on the ambien inspite of potential for addiction and further trouble with sleep.  Will add the ambien 5mg as standing and the next 5mg is for prn if still with insomnia.
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
## 2021-12-17 NOTE — DISCHARGE NOTE BEHAVIORAL HEALTH - CONDITIONS AT DISCHARGE
Patient alert, oriented x3. Therapist and nurse reviewed safety plan with patient who denies any thoughts to self harm or others. SW and nurse reviewed discharge plan with patient who is in agreement with plan. Patient received a copy of her discharge instructions. All belongings returned to patient. Patient appropriately dressed prior to leaving the unit.

## 2021-12-17 NOTE — DISCHARGE NOTE BEHAVIORAL HEALTH - NSBHDCSUICFCTRSFT_PSY_A_CORE
risk factors include mood disorder, anxiety disorder, current depression and anxiety, insomnia, anhedonia, recent suicide ideation, ongoing psychosocial stressors.

## 2021-12-17 NOTE — DISCHARGE NOTE BEHAVIORAL HEALTH - HPI (INCLUDE ILLNESS QUALITY, SEVERITY, DURATION, TIMING, CONTEXT, MODIFYING FACTORS, ASSOCIATED SIGNS AND SYMPTOMS)
71F, living alone in basement apt, employed working two jobs, with PMH of HTN, DM2, asthma, HLD, psych hx of MDD, anxiety, no known suicide attempts, 2 prior hospitalizations in 1980s, currently in outpatient care, no substance abuse, now BIBS after speaking with sister for evaluation of several months of worsening depression and anxiety.     Patient consented to remote evaluation    Patient states that she had been doing well, stable on long term medication regimen of remeron, buspar, until august when she found out that she was up for senior housing and also that she would have to leave one of her jobs. She states that at that point she started feeling more stressed and anxious, with worsening GI discomfort, which is where she states that she holds her stress. In october she had a routine checkup and was told that she was anemic, referred to GI doctor and had a scope 3-4 weeks ago. Sicne then more anxiety symptoms progressing to the point of her not being able to drive this week. She also reports low mood, anhedonia, insomnia (6h per night vs 8 when baseline), fatigue, difficulty concentrating, racing thoughts. She denies that she wants to hurt or kill herself, but states that she wants these feelings to stop. When asked specifically she acknowledged that this weekend she had thoughts of overdosing to end her pain but denies any preparatory action, denies any current plan or intent. Denies having access to firearms, denies etoh and drug use, denies changes in medications other than starting xanax a month or two ago (istop confirmed, .25mg PO BID), denies auditory, visual, or tactile hallucinations, denies paranoia. She states that today she came home and called her sister who told her it was a good idea for her to come to the hospital so she did. She is not sure if her medications are helping and does not want to do. She is amenable to inpatient psychiatric hospitalization at this time. Pt is a 71YOF, living alone in basement [facility], employed working two jobs, with PMH of HTN, DM2, asthma, HLD, psych hx of MDD, anxiety, no known suicide attempts, 2 prior hospitalizations in 1980s, currently in outpatient care, no substance abuse, now BIBS after speaking with sister for evaluation of several months of worsening depression and anxiety.   On admission: Patient states that she had been doing well, stable on long term medication regimen of remeron, buspar, until august when she found out that she was up for senior housing and also that she would have to leave one of her jobs. She states that at that point she started feeling more stressed and anxious, with worsening GI discomfort, which is where she states that she holds her stress. In october she had a routine checkup and was told that she was anemic, referred to GI doctor and had a scope 3-4 weeks ago. Sicne then more anxiety symptoms progressing to the point of her not being able to drive this week. She also reports low mood, anhedonia, insomnia (6h per night vs 8 when baseline), fatigue, difficulty concentrating, racing thoughts. She denies that she wants to hurt or kill herself, but states that she wants these feelings to stop. When asked specifically she acknowledged that this weekend she had thoughts of overdosing to end her pain but denies any preparatory action, denies any current plan or intent. Denies having access to firearms, denies etoh and drug use, denies changes in medications other than starting xanax a month or two ago (istop confirmed, .25mg PO BID), denies auditory, visual, or tactile hallucinations, denies paranoia. She states that today she came home and called her sister who told her it was a good idea for her to come to the hospital so she did. She is not sure if her medications are helping and does not want to do. She is amenable to inpatient psychiatric hospitalization at this time.

## 2021-12-17 NOTE — DISCHARGE NOTE BEHAVIORAL HEALTH - PAST PSYCHIATRIC HISTORY
Pt was hospitalized x2 in [facility] for depression in 1984 and received ECT. Pt was received medication management with Dr. Azar.

## 2021-12-17 NOTE — DISCHARGE NOTE BEHAVIORAL HEALTH - NSBHDCSUICPROTECTFT_PSY_A_CORE
protective factors include lack of prior attempts, lack of psychosis, sobriety, adherence to medications, engagement in work and treatment, supportive family

## 2021-12-17 NOTE — DISCHARGE NOTE BEHAVIORAL HEALTH - NSBHDCREFEROTHERFT_PSY_A_CORE
[facility]- for psychiatric crises (available AFTER HOURS)  24/7 hotline: [phone]  Address:  [address], [place] [address]

## 2021-12-17 NOTE — PROGRESS NOTE BEHAVIORAL HEALTH - NSBHCHARTREVIEWVS_PSY_A_CORE FT
Vital Signs Last 24 Hrs  T(C): 36.7 (16 Dec 2021 07:34), Max: 36.7 (16 Dec 2021 07:34)  T(F): 98.1 (16 Dec 2021 07:34), Max: 98.1 (16 Dec 2021 07:34)  HR: --  BP: --  BP(mean): --  RR: 18 (16 Dec 2021 07:34) (18 - 18)  SpO2: 99% (16 Dec 2021 07:34) (99% - 99%) Vital Signs Last 24 Hrs  T(C): 36.7 (17 Dec 2021 07:24), Max: 36.7 (17 Dec 2021 07:24)  T(F): 98 (17 Dec 2021 07:24), Max: 98 (17 Dec 2021 07:24)  HR: --  BP: --  BP(mean): --  RR: 16 (17 Dec 2021 07:24) (16 - 16)  SpO2: 100% (17 Dec 2021 07:24) (100% - 100%)

## 2021-12-17 NOTE — DISCHARGE NOTE BEHAVIORAL HEALTH - NSBHDCTESTSFT_PSY_A_CORE
Thyroid Stimulating Hormone, Serum (12.09.21 @ 07:39)    Thyroid Stimulating Hormone, Serum: 0.92 uU/mL  Hemoglobin A1C, Whole Blood (04.11.18 @ 05:03)    Hemoglobin A1C, Whole Blood: 7.9: Method: Immunoassay       Reference Range                4.0-5.6%       High risk (prediabetic)        5.7-6.4%       Diabetic, diagnostic             >=6.5%       ADA diabetic treatment goal       <7.0%  The Hemoglobin A1c testing is NGSP-certified.Reference ranges are based  upon the 2010 recommendations of  the American Diabetes Association.  Interpretation may vary for children  and adolescents. %  Lipid Profile (12.09.21 @ 07:39)    Cholesterol, Serum: 110 mg/dL    Triglycerides, Serum: 101 mg/dL    HDL Cholesterol, Serum: 46 mg/dL    Non HDL Cholesterol: 64: Patients Atherosclerotic Cardiovascular Disease (ASCVD) Risk  Optimal Level (mg/dL)  LDL Cholesterol (LDL-C)  All Patients                                < 100  ASCVD at Very High Risk{1}    < 70  Non-HDL Cholesterol (Non-HDL-C)  All Patients                       < 130  ASCVD at Very High Risk{1}   < 100  Non-HDL-Cholesterol (Non-HDL-C) is also a key target for cardiovascular  risk reduction.  Consider Familial Hypercholesterolemia when: LDL-C > 190 mg/dL or  Non-HDL-C > 220 mg/dL.  LDL-C calculation using the Friedewald equation is not provided when  triglycerides > 400 mg/dL, in which case we recommend repeating the test  after fasting, if it was not done before.  When triglycerides >150 mg/dL, calculated LDL-C is provided but maystill  be inaccurate (particularly when LDL-C < 70 mg/dL). It can be  recalculated off-line using other equations (e.g. Juan F SS, Mona CADE,  Aamir PAINTER, et al.MELANIA 2013;310:2061 - 8).  {1} MocaSivakumar M.,et al. "2019 AHA/ACC. . . guideline on the  management of blood cholesterol: a report of the American College of  Cardiology/American Heart Association Task Force on Clinical Practice  Guidelines." Circulation;139:e1082 - e1143.  These values apply only to persons 20 years and older.  Lipid Panel updated with new test, reference ranges and interpretive  comments effective 10-. mg/dL    LDL Cholesterol Calculated: 44 mg/dL  Valproic Acid Level, Serum (12.18.21 @ 08:17)    Valproic Acid Level, Serum: 56 ug/mL

## 2021-12-17 NOTE — DISCHARGE NOTE BEHAVIORAL HEALTH - NSBHDCMEDICALFT_PSY_A_CORE
PMH of HTN, DM2, asthma, HLD  enalapril 5 milliGRAM(s) Oral daily  glucagon  Injectable 1 milliGRAM(s) IntraMuscular once  insulin lispro (ADMELOG) corrective regimen sliding scale   SubCutaneous three times a day before meals  insulin lispro (ADMELOG) corrective regimen sliding scale   SubCutaneous at bedtime  metFORMIN 1000 milliGRAM(s) Oral two times a day  mometasone 220 MICROgram(s) Inhaler 1 Puff(s) Inhalation two times a day

## 2021-12-17 NOTE — DISCHARGE NOTE BEHAVIORAL HEALTH - NSBHDCSUICFCTRMIT_PSY_A_CORE
pt was hospitalised , medication and psychrotherapy were used with good response and reductant of acute risk factors: mood, anhedonia, suicidal thoughts. Pt improved, . She continues  ot have chr insomnia

## 2021-12-17 NOTE — DISCHARGE NOTE BEHAVIORAL HEALTH - MEDICATION SUMMARY - MEDICATIONS TO TAKE
I will START or STAY ON the medications listed below when I get home from the hospital:    enalapril 5 mg oral tablet  -- 1 tab(s) by mouth once a day  -- Indication: For BP    divalproex sodium 500 mg oral delayed release tablet  -- 1 tab(s) by mouth once a day (at bedtime)  -- Indication: For Mood stabilisation     LORazepam 0.5 mg oral tablet  -- 1 tab(s) by mouth once a day (at bedtime) -for anxiety MDD:0.5 mg   -- Indication: For Anxiety    mirtazapine 30 mg oral tablet  -- 1 tab(s) by mouth once a day (at bedtime) -for anxiety MDD:30mg  -- Indication: For Bipolar depression    metFORMIN 1000 mg oral tablet  -- 1 tab(s) by mouth 2 times a day  -- Indication: For dm    lovastatin 20 mg oral tablet  -- 1 tab(s) by mouth once a day  -- Indication: For HLD    busPIRone 5 mg oral tablet  -- 1 tab(s) by mouth 3 times a day, As Needed -for anxiety MDD:15mg   -- Indication: For Anxiety

## 2021-12-17 NOTE — DISCHARGE NOTE BEHAVIORAL HEALTH - NSBHDCMEDSFT_PSY_A_CORE
busPIRone 10 milliGRAM(s) Oral two times a day  diVALproex  milliGRAM(s) Oral at bedtime  LORazepam     Tablet 0.5 milliGRAM(s) Oral at bedtime  metFORMIN 1000 milliGRAM(s) Oral two times a day  mirtazapine 30 milliGRAM(s) Oral at bedtime  improved, no side effects

## 2021-12-17 NOTE — DISCHARGE NOTE BEHAVIORAL HEALTH - NSBHDCCRISISPLAN1FT_PSY_A_CORE
Tell a trusted friend/family member, tell housing staff, tell clinic staff, call a crisis line ( Crisis Center ph. [phone], [facility] DASH [phone], [facility] (peer support) ph. [phone]), return to the nearest emergency room. You may call 9-1-1 for assistance.

## 2021-12-17 NOTE — DISCHARGE NOTE BEHAVIORAL HEALTH - NSBHDCCRISISPLAN2FT_PSY_A_CORE
[facility]: [phone]  KASEY- Jayshree Humphrey LMSW  Psychiatrist- Dr. Olsen

## 2021-12-18 LAB — VALPROATE SERPL-MCNC: 56 UG/ML — SIGNIFICANT CHANGE UP (ref 50–100)

## 2021-12-18 RX ADMIN — Medication 10 MILLIGRAM(S): at 09:06

## 2021-12-18 RX ADMIN — MOMETASONE FUROATE 1 PUFF(S): 220 INHALANT RESPIRATORY (INHALATION) at 21:34

## 2021-12-18 RX ADMIN — METFORMIN HYDROCHLORIDE 1000 MILLIGRAM(S): 850 TABLET ORAL at 09:06

## 2021-12-18 RX ADMIN — Medication 1: at 17:34

## 2021-12-18 RX ADMIN — Medication 50 MILLIGRAM(S): at 21:38

## 2021-12-18 RX ADMIN — Medication 10 MILLIGRAM(S): at 21:37

## 2021-12-18 RX ADMIN — Medication 30 MILLILITER(S): at 15:20

## 2021-12-18 RX ADMIN — Medication 0.5 MILLIGRAM(S): at 21:38

## 2021-12-18 RX ADMIN — MIRTAZAPINE 30 MILLIGRAM(S): 45 TABLET, ORALLY DISINTEGRATING ORAL at 21:38

## 2021-12-18 RX ADMIN — Medication 5 MILLIGRAM(S): at 10:01

## 2021-12-18 RX ADMIN — ATORVASTATIN CALCIUM 10 MILLIGRAM(S): 80 TABLET, FILM COATED ORAL at 21:37

## 2021-12-18 RX ADMIN — DIVALPROEX SODIUM 500 MILLIGRAM(S): 500 TABLET, DELAYED RELEASE ORAL at 21:38

## 2021-12-18 RX ADMIN — Medication 1: at 08:06

## 2021-12-18 RX ADMIN — METFORMIN HYDROCHLORIDE 1000 MILLIGRAM(S): 850 TABLET ORAL at 21:38

## 2021-12-19 RX ADMIN — Medication 0.5 MILLIGRAM(S): at 20:42

## 2021-12-19 RX ADMIN — METFORMIN HYDROCHLORIDE 1000 MILLIGRAM(S): 850 TABLET ORAL at 20:42

## 2021-12-19 RX ADMIN — MIRTAZAPINE 30 MILLIGRAM(S): 45 TABLET, ORALLY DISINTEGRATING ORAL at 20:42

## 2021-12-19 RX ADMIN — Medication 1: at 07:58

## 2021-12-19 RX ADMIN — DIVALPROEX SODIUM 500 MILLIGRAM(S): 500 TABLET, DELAYED RELEASE ORAL at 20:41

## 2021-12-19 RX ADMIN — Medication 10 MILLIGRAM(S): at 09:10

## 2021-12-19 RX ADMIN — Medication 1: at 17:50

## 2021-12-19 RX ADMIN — METFORMIN HYDROCHLORIDE 1000 MILLIGRAM(S): 850 TABLET ORAL at 09:10

## 2021-12-19 RX ADMIN — MOMETASONE FUROATE 1 PUFF(S): 220 INHALANT RESPIRATORY (INHALATION) at 09:17

## 2021-12-19 RX ADMIN — Medication 5 MILLIGRAM(S): at 09:56

## 2021-12-19 RX ADMIN — ATORVASTATIN CALCIUM 10 MILLIGRAM(S): 80 TABLET, FILM COATED ORAL at 20:42

## 2021-12-19 RX ADMIN — Medication 2 MILLIGRAM(S): at 13:59

## 2021-12-19 RX ADMIN — Medication 10 MILLIGRAM(S): at 20:42

## 2021-12-20 PROCEDURE — 99232 SBSQ HOSP IP/OBS MODERATE 35: CPT

## 2021-12-20 RX ADMIN — Medication 1: at 08:17

## 2021-12-20 RX ADMIN — METFORMIN HYDROCHLORIDE 1000 MILLIGRAM(S): 850 TABLET ORAL at 20:59

## 2021-12-20 RX ADMIN — ATORVASTATIN CALCIUM 10 MILLIGRAM(S): 80 TABLET, FILM COATED ORAL at 20:59

## 2021-12-20 RX ADMIN — Medication 50 MILLIGRAM(S): at 20:59

## 2021-12-20 RX ADMIN — DIVALPROEX SODIUM 500 MILLIGRAM(S): 500 TABLET, DELAYED RELEASE ORAL at 20:59

## 2021-12-20 RX ADMIN — Medication 5 MILLIGRAM(S): at 09:11

## 2021-12-20 RX ADMIN — MIRTAZAPINE 30 MILLIGRAM(S): 45 TABLET, ORALLY DISINTEGRATING ORAL at 20:59

## 2021-12-20 RX ADMIN — METFORMIN HYDROCHLORIDE 1000 MILLIGRAM(S): 850 TABLET ORAL at 09:11

## 2021-12-20 RX ADMIN — Medication 2 MILLIGRAM(S): at 14:42

## 2021-12-20 RX ADMIN — Medication 10 MILLIGRAM(S): at 09:11

## 2021-12-20 RX ADMIN — Medication 10 MILLIGRAM(S): at 20:59

## 2021-12-20 RX ADMIN — Medication 1: at 17:25

## 2021-12-20 RX ADMIN — Medication 1: at 12:17

## 2021-12-20 RX ADMIN — Medication 0.5 MILLIGRAM(S): at 20:59

## 2021-12-20 NOTE — PROGRESS NOTE BEHAVIORAL HEALTH - RISK ASSESSMENT
low with decreased depression anxiety , denies  suicidal ideation , not with any intent or plan  pt claims she is obsessing with the insomnia , pt wanting xanax   mitigating denies any intent   protective place to live, employed   chronic prior psych admission

## 2021-12-20 NOTE — PROGRESS NOTE BEHAVIORAL HEALTH - NSBHCHARTREVIEWVS_PSY_A_CORE FT
Vital Signs Last 24 Hrs  T(C): 36.7 (20 Dec 2021 07:32), Max: 36.7 (20 Dec 2021 07:32)  T(F): 98.1 (20 Dec 2021 07:32), Max: 98.1 (20 Dec 2021 07:32)    RR: 18 (20 Dec 2021 07:32) (18 - 18)  SpO2: 99% (20 Dec 2021 07:32) (99% - 99%)

## 2021-12-20 NOTE — PROGRESS NOTE BEHAVIORAL HEALTH - NSBHFUPINTERVALHXFT_PSY_A_CORE
PT is visible on the unit, engaged in activities. Mod si better, denies SI, HI, AH, VH, IP. Endorsing chr problems with sleep.  She states she expected to be dc/ed today, but her d/c date is tomorrow.

## 2021-12-21 VITALS — RESPIRATION RATE: 16 BRPM | OXYGEN SATURATION: 100 % | TEMPERATURE: 98 F

## 2021-12-21 PROCEDURE — 99239 HOSP IP/OBS DSCHRG MGMT >30: CPT

## 2021-12-21 RX ORDER — BECLOMETHASONE DIPROPIONATE 40 UG/1
2 AEROSOL, METERED RESPIRATORY (INHALATION)
Qty: 0 | Refills: 0 | DISCHARGE

## 2021-12-21 RX ORDER — METFORMIN HYDROCHLORIDE 850 MG/1
1 TABLET ORAL
Qty: 28 | Refills: 1
Start: 2021-12-21 | End: 2022-01-17

## 2021-12-21 RX ORDER — GLIMEPIRIDE 1 MG
1 TABLET ORAL
Qty: 0 | Refills: 0 | DISCHARGE

## 2021-12-21 RX ORDER — METFORMIN HYDROCHLORIDE 850 MG/1
1 TABLET ORAL
Qty: 0 | Refills: 0 | DISCHARGE

## 2021-12-21 RX ORDER — MIRTAZAPINE 45 MG/1
1 TABLET, ORALLY DISINTEGRATING ORAL
Qty: 0 | Refills: 0 | DISCHARGE

## 2021-12-21 RX ORDER — LOVASTATIN 20 MG
1 TABLET ORAL
Qty: 0 | Refills: 0 | DISCHARGE

## 2021-12-21 RX ORDER — ALBUTEROL 90 UG/1
2 AEROSOL, METERED ORAL
Qty: 0 | Refills: 0 | DISCHARGE

## 2021-12-21 RX ORDER — LOVASTATIN 20 MG
1 TABLET ORAL
Qty: 14 | Refills: 1
Start: 2021-12-21 | End: 2022-01-17

## 2021-12-21 RX ORDER — DIVALPROEX SODIUM 500 MG/1
1 TABLET, DELAYED RELEASE ORAL
Qty: 14 | Refills: 1
Start: 2021-12-21 | End: 2022-01-17

## 2021-12-21 RX ORDER — MIRTAZAPINE 45 MG/1
1 TABLET, ORALLY DISINTEGRATING ORAL
Qty: 14 | Refills: 1
Start: 2021-12-21 | End: 2022-01-17

## 2021-12-21 RX ADMIN — Medication 5 MILLIGRAM(S): at 09:44

## 2021-12-21 RX ADMIN — Medication 1: at 07:40

## 2021-12-21 RX ADMIN — MOMETASONE FUROATE 1 PUFF(S): 220 INHALANT RESPIRATORY (INHALATION) at 07:55

## 2021-12-21 RX ADMIN — METFORMIN HYDROCHLORIDE 1000 MILLIGRAM(S): 850 TABLET ORAL at 09:26

## 2021-12-21 RX ADMIN — Medication 10 MILLIGRAM(S): at 09:26

## 2021-12-21 NOTE — PROGRESS NOTE BEHAVIORAL HEALTH - SECONDARY DX1
BORIS (generalized anxiety disorder)
Generalized anxiety disorder
BORIS (generalized anxiety disorder)
Generalized anxiety disorder
BORIS (generalized anxiety disorder)
BORIS (generalized anxiety disorder)

## 2021-12-21 NOTE — PROGRESS NOTE BEHAVIORAL HEALTH - NSBHPTASSESSDT_PSY_A_CORE
14-Dec-2021 12:44
10-Dec-2021 12:20
17-Dec-2021 09:31
21-Dec-2021 09:32
16-Dec-2021 20:28
09-Dec-2021 11:24
08-Dec-2021 13:15
11-Dec-2021 11:14
20-Dec-2021 16:34
12-Dec-2021 11:45
15-Dec-2021 10:03
13-Dec-2021 18:43

## 2021-12-21 NOTE — PROGRESS NOTE BEHAVIORAL HEALTH - NSBHATTESTSEENBY_PSY_A_CORE
attending Psychiatrist without NP/Trainee
NP with telephonic supervision from Attending Psychiatrist
attending Psychiatrist without NP/Trainee

## 2021-12-21 NOTE — PROGRESS NOTE BEHAVIORAL HEALTH - NS ED BHA REVIEW OF ED CHART AVAILABLE LABS REVIEWED
Yes
None available
Yes
None available
Yes

## 2021-12-21 NOTE — PROGRESS NOTE BEHAVIORAL HEALTH - NSBHFUPINTERVALCCFT_PSY_A_CORE
I AM FINE
"I'm still with anxiety and not sleeping well
"I'n still not sleeping well
"I want the ambien since I do not sleep at all. Also I have been on ambien for years"
"I have problems sleeping, just being honest"
"I'n still not sleeping well
"I still don't sleep and I don't know why"
Im anxious and depressed
"I'm still with anxiety and not sleeping well
"I'm still with anxiety and not sleeping well
"I don't know what can be done I don't sleep . I liked what they gave me in the beginning something called zanax"
"Im still depressed but I don't want to hurt myself"

## 2021-12-21 NOTE — PROGRESS NOTE BEHAVIORAL HEALTH - AXIS III
DM2, HTN, HLD, Asthma

## 2021-12-21 NOTE — PROGRESS NOTE BEHAVIORAL HEALTH - PROBLEM SELECTOR PROBLEM 2
BORIS (generalized anxiety disorder)
Generalized anxiety disorder

## 2021-12-21 NOTE — PROGRESS NOTE BEHAVIORAL HEALTH - AFFECT RANGE
Constricted
Full
Full
Constricted
Full
Constricted
Full
Full
Constricted

## 2021-12-21 NOTE — PROGRESS NOTE BEHAVIORAL HEALTH - SUMMARY
71F, living alone in basement apt, employed working two jobs, with PMH of HTN, DM2, asthma, HLD, psych hx of MDD, anxiety, no known suicide attempts, 2 prior hospitalizations in 1980s, currently in outpatient care, no substance abuse, now BIBS after speaking with sister for evaluation of several months of worsening depression and anxiety.     Patient has had a significant decline in function owing to worsening anxiety and depression, and this weekend had thoughts of overdosing. She has been getting worse despite outpatient treatment and at this point would benefit from inpatient hospitalization for safety, stabilization, and medication titration.
71F, living alone in basement apt, employed working two jobs, with PMH of HTN, DM2, asthma, HLD, psych hx of MDD, anxiety, no known suicide attempts, 2 prior hospitalizations in 1980s, currently in outpatient care, no substance abuse, now BIBS after speaking with sister for evaluation of several months of worsening depression and anxiety.     12/20/21 pt improved. d/c excepted tomorrow.  12/21/2022: Pt improved, taking her meds, d/c today
71F, living alone in basement apt, employed working two jobs, with PMH of HTN, DM2, asthma, HLD, psych hx of MDD, anxiety, no known suicide attempts, 2 prior hospitalizations in 1980s, currently in outpatient care, no substance abuse, now BIBS after speaking with sister for evaluation of several months of worsening depression and anxiety.     Patient has had a significant decline in function owing to worsening anxiety and depression, and this weekend had thoughts of overdosing. She has been getting worse despite outpatient treatment and at this point would benefit from inpatient hospitalization for safety, stabilization, and medication titration.
71F, living alone in basement apt, employed working two jobs, with PMH of HTN, DM2, asthma, HLD, psych hx of MDD, anxiety, no known suicide attempts, 2 prior hospitalizations in 1980s, currently in outpatient care, no substance abuse, now BIBS after speaking with sister for evaluation of several months of worsening depression and anxiety.     12/20/21 pt improved. d/c excepted tomorrow.
71F, living alone in basement apt, employed working two jobs, with PMH of HTN, DM2, asthma, HLD, psych hx of MDD, anxiety, no known suicide attempts, 2 prior hospitalizations in 1980s, currently in outpatient care, no substance abuse, now BIBS after speaking with sister for evaluation of several months of worsening depression and anxiety.     Patient has had a significant decline in function owing to worsening anxiety and depression, and this weekend had thoughts of overdosing. She has been getting worse despite outpatient treatment and at this point would benefit from inpatient hospitalization for safety, stabilization, and medication titration.

## 2021-12-21 NOTE — PROGRESS NOTE BEHAVIORAL HEALTH - AFFECT QUALITY
Depressed/Anxious
Euthymic
Euthymic
Depressed/Anxious
Euthymic

## 2021-12-21 NOTE — PROGRESS NOTE BEHAVIORAL HEALTH - NSBHCHARTREVIEWVS_PSY_A_CORE FT
Vital Signs Last 24 Hrs  T(C): 36.6 (21 Dec 2021 07:28), Max: 36.6 (21 Dec 2021 07:28)  T(F): 97.9 (21 Dec 2021 07:28), Max: 97.9 (21 Dec 2021 07:28)    RR: 16 (21 Dec 2021 07:28) (16 - 16)  SpO2: 100% (21 Dec 2021 07:28) (100% - 100%)

## 2021-12-21 NOTE — PROGRESS NOTE BEHAVIORAL HEALTH - ESTIMATED DISCHARGE DATE
24-Dec-2021
19-Dec-2021

## 2021-12-21 NOTE — PROGRESS NOTE BEHAVIORAL HEALTH - PROBLEM SELECTOR PROBLEM 1
Bipolar depression
Severe episode of recurrent major depressive disorder, without psychotic features
Bipolar depression

## 2021-12-21 NOTE — PROGRESS NOTE BEHAVIORAL HEALTH - PRIMARY DX
Bipolar depression
Severe episode of recurrent major depressive disorder, without psychotic features
Bipolar depression
Severe episode of recurrent major depressive disorder, without psychotic features
Bipolar depression

## 2021-12-21 NOTE — PROGRESS NOTE BEHAVIORAL HEALTH - PROBLEM SELECTOR PLAN 2
xanax  encourage to attend groups
Buspar 10mg po bid

## 2021-12-21 NOTE — PROGRESS NOTE BEHAVIORAL HEALTH - PROBLEM SELECTOR PLAN 1
Depakote  250 mg po bid  decreased  Buspar to 10 mg po bid
depakote  250mg po bid  decreased  buspar to 10mg po bid
Depakote  250 mg po bid  decreased  Buspar to 10 mg po bid
increased remerom to 45mg hs
Depakote  250 mg po bid  decreased  Buspar to 10 mg po bid
Depakote  250 mg po bid  decreased  Buspar to 10 mg po bid  trazodone 50 mg for sleep   ambien 5mg po prn for insomnia
Depakote  250 mg po bid  decreased  Buspar to 10 mg po bid
Depakote  250 mg po bid  decreased  Buspar to 10 mg po bid
Depakote  250 mg po bid  decreased  Buspar to 10 mg po bid  trazodone 50 mg for sleep   ambien 5mg po prn for insomnia

## 2021-12-21 NOTE — PROGRESS NOTE BEHAVIORAL HEALTH - NSBHCHARTREVIEWINVESTIGATE_PSY_A_CORE FT
< from: 12 Lead ECG (12.07.21 @ 18:16) >      Ventricular Rate 81 BPM    Atrial Rate 81 BPM    P-R Interval 160 ms    QRS Duration 86 ms    Q-T Interval 362 ms    QTC Calculation(Bazett) 420 ms    P Axis 35 degrees    R Axis 6 degrees    T Axis 24 degrees    Diagnosis Line Normal sinus rhythm  Normal ECG  When compared with ECG of 12-APR-2018 06:33,  No significant change was found  Confirmed by GABE TRINIDAD MD ([id number]) on 12/8/2021 8:21:29 AM
< from: 12 Lead ECG (12.07.21 @ 18:16) >      Ventricular Rate 81 BPM    Atrial Rate 81 BPM    P-R Interval 160 ms    QRS Duration 86 ms    Q-T Interval 362 ms    QTC Calculation(Bazett) 420 ms    P Axis 35 degrees    R Axis 6 degrees    T Axis 24 degrees    Diagnosis Line Normal sinus rhythm  Normal ECG  When compared with ECG of 12-APR-2018 06:33,  No significant change was found  Confirmed by GABE TRINIDAD MD ([id number]) on 12/8/2021 8:21:29 AM
< from: 12 Lead ECG (12.07.21 @ 18:16) >      Ventricular Rate 81 BPM    Atrial Rate 81 BPM    P-R Interval 160 ms    QRS Duration 86 ms    Q-T Interval 362 ms    QTC Calculation(Bazett) 420 ms    P Axis 35 degrees    R Axis 6 degrees    T Axis 24 degrees    Diagnosis Line Normal sinus rhythm  Normal ECG  When compared with ECG of 12-APR-2018 06:33,  No significant change was found  Confirmed by GABE TRINIDAD MD ([id number]) on 12/8/2021 8:21:29 AM
< from: 12 Lead ECG (12.07.21 @ 18:16) >      Ventricular Rate 81 BPM    Atrial Rate 81 BPM    P-R Interval 160 ms    QRS Duration 86 ms    Q-T Interval 362 ms    QTC Calculation(Bazett) 420 ms    P Axis 35 degrees    R Axis 6 degrees    T Axis 24 degrees    Diagnosis Line Normal sinus rhythm  Normal ECG  When compared with ECG of 12-APR-2018 06:33,  No significant change was found  Confirmed by GABE TRINIDAD MD ([id number]) on 12/8/2021 8:21:29 AM
< from: 12 Lead ECG (12.07.21 @ 18:16) >      Ventricular Rate 81 BPM    Atrial Rate 81 BPM    P-R Interval 160 ms    QRS Duration 86 ms    Q-T Interval 362 ms    QTC Calculation(Bazett) 420 ms    P Axis 35 degrees    R Axis 6 degrees    T Axis 24 degrees    Diagnosis Line Normal sinus rhythm  Normal ECG  When compared with ECG of 12-APR-2018 06:33,  No significant change was found  Confirmed by GABE TRINIDAD MD ([id number]) on 12/8/2021 8:21:29 AM
< from: 12 Lead ECG (12.07.21 @ 18:16) >      Ventricular Rate 81 BPM    Atrial Rate 81 BPM    P-R Interval 160 ms    QRS Duration 86 ms    Q-T Interval 362 ms    QTC Calculation(Bazett) 420 ms    P Axis 35 degrees    R Axis 6 degrees    T Axis 24 degrees    Diagnosis Line Normal sinus rhythm  Normal ECG  When compared with ECG of 12-APR-2018 06:33,  No significant change was found  Confirmed by GABE TRINIDAD MD ([id number]) on 12/8/2021 8:21:29 AM
< from: 12 Lead ECG (12.07.21 @ 18:16) >      Ventricular Rate 81 BPM    Atrial Rate 81 BPM    P-R Interval 160 ms    QRS Duration 86 ms    Q-T Interval 362 ms    QTC Calculation(Bazett) 420 ms    P Axis 35 degrees    R Axis 6 degrees    T Axis 24 degrees    Diagnosis Line Normal sinus rhythm  Normal ECG  When compared with ECG of 12-APR-2018 06:33,  No significant change was found  Confirmed by GABE TRINIDAD MD ([id number]) on 12/8/2021 8:21:29 AM
< from: 12 Lead ECG (12.07.21 @ 18:16) >      Ventricular Rate 81 BPM    Atrial Rate 81 BPM    P-R Interval 160 ms    QRS Duration 86 ms    Q-T Interval 362 ms    QTC Calculation(Bazett) 420 ms    P Axis 35 degrees    R Axis 6 degrees    T Axis 24 degrees    Diagnosis Line Normal sinus rhythm  Normal ECG  When compared with ECG of 12-APR-2018 06:33,  No significant change was found  Confirmed by GABE TRINIDAD MD ([id number]) on 12/8/2021 8:21:29 AM
< from: 12 Lead ECG (12.07.21 @ 18:16) >      Ventricular Rate 81 BPM    Atrial Rate 81 BPM    P-R Interval 160 ms    QRS Duration 86 ms    Q-T Interval 362 ms    QTC Calculation(Bazett) 420 ms    P Axis 35 degrees    R Axis 6 degrees    T Axis 24 degrees    Diagnosis Line Normal sinus rhythm  Normal ECG  When compared with ECG of 12-APR-2018 06:33,  No significant change was found  Confirmed by GABE TRINIDAD MD ([id number]) on 12/8/2021 8:21:29 AM

## 2021-12-21 NOTE — PROGRESS NOTE BEHAVIORAL HEALTH - NSBHFUPTYPE_PSY_A_CORE
Inpatient
Inpatient-On Service Note
Inpatient
Consult Follow Up
Inpatient

## 2021-12-21 NOTE — PROGRESS NOTE BEHAVIORAL HEALTH - NSBHFUPINTERVALHXFT_PSY_A_CORE
PT is visible on the unit, casually dressed and well groomed, engaged in activities. Pt is ready for d/c today. Mood is neutral and stable. PT  denies SI, HI, AH, VH, PI. Endorsing chr problems with sleep.

## 2021-12-21 NOTE — PROGRESS NOTE BEHAVIORAL HEALTH - NSBHADMITIPOBSFT_PSY_A_CORE
pt denies any suicidal ideation intent or plan
pt denies any intent
pt denies any suicidal ideation intent or plan

## 2021-12-21 NOTE — PROGRESS NOTE BEHAVIORAL HEALTH - MOOD
Depressed/Anxious
Depressed
Normal
Depressed/Anxious

## 2021-12-21 NOTE — PROGRESS NOTE BEHAVIORAL HEALTH - NSBHCONSORIP_PSY_A_CORE
Inpatient Admission...
Consult...
Inpatient Admission...

## 2021-12-22 RX ORDER — TRAZODONE HCL 50 MG
1 TABLET ORAL
Qty: 14 | Refills: 1
Start: 2021-12-22 | End: 2022-01-18

## 2021-12-22 NOTE — CHART NOTE - NSCHARTNOTEFT_GEN_A_CORE
Patient arrived home safely, has medication and denies feeling suicidal.
Pt discharged from  on 12/21/2021. Pt called to request new prescription for recently begun Trazodone 50 mg po qhs prn insomnia. Pt reported that this med has been helping with her chronic insomnia.   Plan  1.Writer e-prescribed pt Trazodone 50 mg po qhs prn insomnia # 14 with 1 refill to pt pharmacy [facility]  in [place], [place]  2.Pt to call prn  with any further questions

## 2021-12-28 DIAGNOSIS — Z88.0 ALLERGY STATUS TO PENICILLIN: ICD-10-CM

## 2021-12-28 DIAGNOSIS — E78.5 HYPERLIPIDEMIA, UNSPECIFIED: ICD-10-CM

## 2021-12-28 DIAGNOSIS — F31.30 BIPOLAR DISORDER, CURRENT EPISODE DEPRESSED, MILD OR MODERATE SEVERITY, UNSPECIFIED: ICD-10-CM

## 2021-12-28 DIAGNOSIS — G47.00 INSOMNIA, UNSPECIFIED: ICD-10-CM

## 2021-12-28 DIAGNOSIS — F41.1 GENERALIZED ANXIETY DISORDER: ICD-10-CM

## 2021-12-28 DIAGNOSIS — I10 ESSENTIAL (PRIMARY) HYPERTENSION: ICD-10-CM

## 2021-12-28 DIAGNOSIS — Z79.52 LONG TERM (CURRENT) USE OF SYSTEMIC STEROIDS: ICD-10-CM

## 2021-12-28 DIAGNOSIS — J45.909 UNSPECIFIED ASTHMA, UNCOMPLICATED: ICD-10-CM

## 2021-12-28 DIAGNOSIS — E11.9 TYPE 2 DIABETES MELLITUS WITHOUT COMPLICATIONS: ICD-10-CM

## 2021-12-28 DIAGNOSIS — R45.851 SUICIDAL IDEATIONS: ICD-10-CM

## 2022-04-30 ENCOUNTER — EMERGENCY (EMERGENCY)
Facility: HOSPITAL | Age: 72
LOS: 0 days | Discharge: HOME | End: 2022-04-30
Attending: EMERGENCY MEDICINE
Payer: MEDICARE

## 2022-04-30 VITALS
OXYGEN SATURATION: 98 % | SYSTOLIC BLOOD PRESSURE: 144 MMHG | HEART RATE: 95 BPM | DIASTOLIC BLOOD PRESSURE: 88 MMHG | TEMPERATURE: 98 F | RESPIRATION RATE: 16 BRPM

## 2022-04-30 VITALS — HEIGHT: 62 IN | WEIGHT: 145.06 LBS

## 2022-04-30 DIAGNOSIS — Z88.0 ALLERGY STATUS TO PENICILLIN: ICD-10-CM
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
## 2022-04-30 DIAGNOSIS — S60.551A SUPERFICIAL FOREIGN BODY OF RIGHT HAND, INITIAL ENCOUNTER: ICD-10-CM

## 2022-04-30 DIAGNOSIS — Z23 ENCOUNTER FOR IMMUNIZATION: ICD-10-CM

## 2022-04-30 DIAGNOSIS — J45.909 UNSPECIFIED ASTHMA, UNCOMPLICATED: ICD-10-CM

## 2022-04-30 DIAGNOSIS — M79.641 PAIN IN RIGHT HAND: ICD-10-CM
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
## 2022-04-30 DIAGNOSIS — Z88.8 ALLERGY STATUS TO OTHER DRUGS, MEDICAMENTS AND BIOLOGICAL SUBSTANCES STATUS: ICD-10-CM

## 2022-04-30 PROCEDURE — 99283 EMERGENCY DEPT VISIT LOW MDM: CPT

## 2022-04-30 PROCEDURE — 90715 TDAP VACCINE 7 YRS/> IM: CPT

## 2022-04-30 PROCEDURE — 90471 IMMUNIZATION ADMIN: CPT

## 2022-04-30 PROCEDURE — 10120 INC&RMVL FB SUBQ TISS SMPL: CPT

## 2022-04-30 PROCEDURE — 99283 EMERGENCY DEPT VISIT LOW MDM: CPT | Mod: 25

## 2022-04-30 RX ORDER — TETANUS TOXOID, REDUCED DIPHTHERIA TOXOID AND ACELLULAR PERTUSSIS VACCINE, ADSORBED 5; 2.5; 8; 8; 2.5 [IU]/.5ML; [IU]/.5ML; UG/.5ML; UG/.5ML; UG/.5ML
0.5 SUSPENSION INTRAMUSCULAR ONCE
Refills: 0 | Status: COMPLETED | OUTPATIENT
Start: 2022-04-30 | End: 2022-04-30

## 2022-04-30 RX ADMIN — TETANUS TOXOID, REDUCED DIPHTHERIA TOXOID AND ACELLULAR PERTUSSIS VACCINE, ADSORBED 0.5 MILLILITER(S): 5; 2.5; 8; 8; 2.5 SUSPENSION INTRAMUSCULAR at 04:48

## 2022-04-30 NOTE — ED ADULT TRIAGE NOTE - CHIEF COMPLAINT QUOTE
Patient states she was packing to move and a fishing pole fell onto her right hand and got a fishing hook stuck on top of her right hand about 20 minutes PTA. Pt was unable to remove the fish hook at home. Bleeding controlled. Pt able to move all fingers. Sensation in tact. No medications taken PTA. Unknown last tetanus.

## 2022-04-30 NOTE — ED ADULT NURSE NOTE - NSIMPLEMENTINTERV_GEN_ALL_ED
Implemented All Universal Safety Interventions:  [place] to call system. Call bell, personal items and telephone within reach. Instruct patient to call for assistance. Room bathroom lighting operational. Non-slip footwear when patient is off stretcher. Physically safe environment: no spills, clutter or unnecessary equipment. Stretcher in lowest position, wheels locked, appropriate side rails in place.

## 2022-04-30 NOTE — ED PROVIDER NOTE - PATIENT PORTAL LINK FT
You can access the FollowMyHealth Patient Portal offered by [facility] by registering at the following website: [email or web address]. By joining [facility] FollowMyHealth portal, you will also be able to view your health information using other applications (apps) compatible with our system.

## 2022-04-30 NOTE — ED PROVIDER NOTE - NSFOLLOWUPINSTRUCTIONS_ED_ALL_ED_FT
please follow up with your doctor in 2-3 days.   take motrin and tylenol for pain.   return to ED for worsening swelling, pain, fever >101, discharge of pus from site or any concerns

## 2022-04-30 NOTE — ED PROCEDURE NOTE - CPROC ED INFORMED CONSENT1
Benefits, risks, and possible complications of procedure explained to patient/caregiver who verbalized understanding and gave verbal consent. home

## 2022-04-30 NOTE — ED ADULT NURSE NOTE - OBJECTIVE STATEMENT
Pt brought to ED from home status post piercing first webbed space of right hand with a fishing hook. Pt states she is in the process of moving; the fishing fell with the hook piercing her hand. Pt denies pain at this time. Pt is ambulatory. A&O x4. Airway is patent, breathing is even and unlabored. Skin is warm and dry, PMS x4 extremities. No additional complaints or requests at this time. Patient safety maintained. Will continue to monitor.

## 2022-04-30 NOTE — ED PROVIDER NOTE - OBJECTIVE STATEMENT
72 y/o female in ED c/o fish hook to right hand x 1 hr.   pt states that she was packing to move when fishing pole fell landing on her right hand.   pt states attempted to remove hook at home without success.   pt denies any fever, HA, cp, sob, n/v/d/abd pain.   states able t move fingers.   unknown last tetanus.

## 2022-05-17 NOTE — PROVIDER CONTACT NOTE (CRITICAL VALUE NOTIFICATION) - NAME OF MD/NP/PA/DO NOTIFIED:
Quality 130: Documentation Of Current Medications In The Medical Record: Current Medications Documented emily

## 2022-07-06 NOTE — ED ADULT TRIAGE NOTE - ACCOMPANIED BY
Elevated blood pressure reading Elevated blood pressure reading Elevated blood pressure reading EMT/paramedic Elevated blood pressure reading

## 2022-10-28 ENCOUNTER — APPOINTMENT (OUTPATIENT)
Dept: OBGYN | Facility: CLINIC | Age: 72
End: 2022-10-28

## 2022-10-28 VITALS
HEIGHT: 60 IN | WEIGHT: 200 LBS | BODY MASS INDEX: 39.27 KG/M2 | RESPIRATION RATE: 16 BRPM | DIASTOLIC BLOOD PRESSURE: 72 MMHG | SYSTOLIC BLOOD PRESSURE: 130 MMHG

## 2022-10-28 DIAGNOSIS — M85.80 OTHER SPECIFIED DISORDERS OF BONE DENSITY AND STRUCTURE, UNSPECIFIED SITE: ICD-10-CM

## 2022-10-28 DIAGNOSIS — Z12.31 ENCOUNTER FOR SCREENING MAMMOGRAM FOR MALIGNANT NEOPLASM OF BREAST: ICD-10-CM

## 2022-10-28 PROCEDURE — 99214 OFFICE O/P EST MOD 30 MIN: CPT

## 2022-10-28 RX ORDER — BECLOMETHASONE DIPROPIONATE 80 UG/1
80 AEROSOL, METERED RESPIRATORY (INHALATION)
Qty: 9 | Refills: 0 | Status: COMPLETED | COMMUNITY
Start: 2017-02-14 | End: 2022-10-28

## 2022-10-28 RX ORDER — ARIPIPRAZOLE 2 MG/1
2 TABLET ORAL
Qty: 15 | Refills: 0 | Status: ACTIVE | COMMUNITY
Start: 2022-10-26

## 2022-10-28 RX ORDER — DIVALPROEX SODIUM 500 MG/1
500 TABLET, DELAYED RELEASE ORAL
Qty: 25 | Refills: 0 | Status: COMPLETED | COMMUNITY
Start: 2022-05-13 | End: 2022-10-28

## 2022-10-28 RX ORDER — BUSPIRONE HYDROCHLORIDE 10 MG/1
10 TABLET ORAL
Refills: 0 | Status: COMPLETED | COMMUNITY
End: 2022-10-28

## 2022-10-28 RX ORDER — ARIPIPRAZOLE 5 MG/1
5 TABLET ORAL
Qty: 15 | Refills: 0 | Status: COMPLETED | COMMUNITY
Start: 2022-08-09 | End: 2022-10-28

## 2022-10-28 RX ORDER — FLUTICASONE PROPIONATE 50 UG/1
50 SPRAY, METERED NASAL
Qty: 48 | Refills: 0 | Status: ACTIVE | COMMUNITY
Start: 2022-08-23

## 2022-10-28 RX ORDER — FLUTICASONE PROPIONATE 110 UG/1
110 AEROSOL, METERED RESPIRATORY (INHALATION)
Refills: 0 | Status: ACTIVE | COMMUNITY

## 2022-10-28 RX ORDER — DIVALPROEX SODIUM 250 MG/1
250 TABLET, DELAYED RELEASE ORAL
Qty: 30 | Refills: 0 | Status: COMPLETED | COMMUNITY
Start: 2022-06-24 | End: 2022-10-28

## 2022-10-28 RX ORDER — LORAZEPAM 0.5 MG/1
0.5 TABLET ORAL
Qty: 30 | Refills: 0 | Status: ACTIVE | COMMUNITY
Start: 2022-09-26

## 2022-10-28 RX ORDER — DOXEPIN HYDROCHLORIDE 10 MG/ML
10 SOLUTION ORAL
Qty: 120 | Refills: 0 | Status: ACTIVE | COMMUNITY
Start: 2022-08-24

## 2022-10-28 RX ORDER — TRIAMCINOLONE ACETONIDE 1 MG/G
0.1 OINTMENT TOPICAL
Qty: 30 | Refills: 0 | Status: ACTIVE | COMMUNITY
Start: 2022-07-11

## 2022-10-28 RX ORDER — ALBUTEROL SULFATE 90 UG/1
108 (90 BASE) AEROSOL, METERED RESPIRATORY (INHALATION)
Qty: 8 | Refills: 0 | Status: COMPLETED | COMMUNITY
Start: 2018-01-08 | End: 2022-10-28

## 2022-10-28 RX ORDER — METFORMIN ER 500 MG 500 MG/1
500 TABLET ORAL
Qty: 360 | Refills: 0 | Status: COMPLETED | COMMUNITY
Start: 2022-08-23 | End: 2022-10-28

## 2022-10-28 RX ORDER — BUSPIRONE HYDROCHLORIDE 15 MG/1
15 TABLET ORAL
Qty: 30 | Refills: 0 | Status: ACTIVE | COMMUNITY
Start: 2022-09-16

## 2022-10-28 RX ORDER — ZOLPIDEM TARTRATE 10 MG/1
10 TABLET ORAL
Qty: 30 | Refills: 0 | Status: COMPLETED | COMMUNITY
Start: 2017-11-16 | End: 2022-10-28

## 2022-10-28 RX ORDER — DIVALPROEX SODIUM 125 MG/1
125 TABLET, DELAYED RELEASE ORAL
Qty: 30 | Refills: 0 | Status: COMPLETED | COMMUNITY
Start: 2022-07-20 | End: 2022-10-28

## 2022-10-28 NOTE — HISTORY OF PRESENT ILLNESS
[FreeTextEntry1] : 72 yo  with LMP age ~56 and hysterectomy .\par \par Pt spent two weeks in [facility] for titration of her psychiatric medications.\par ' loose stools since up for senior housing, stress has led to wt loss\par  endoscopy/colonoscopy  reported neg\par \par Max wt 200 nor 186\par \par Menstrual triad: 12 x 28-35 x 7\par \par OB:\par  TOP\par \par GYN:\par OCP for regulation of cycles\par  HPV\par Menorrhagia - Lupron for myomas\par  endometrial ablation\par  myoma, supracervical hysterectomy (ovaries in place)\par \par Med:\par DM\par depression-  buspirone, new psych\par asthma\par \par sx:\par Deviated septum \par l/s cholecystectomy \par right inner upper breast biopsy\par \par  [Mammogramdate] : 11/21/21 [TextBox_19] : BIRAD 2 heterog dense [BreastSonogramDate] : 11/21/21 [PapSmeardate] : 10/22/21 [BoneDensityDate] : 2020 [ColonoscopyDate] : 12/21

## 2022-10-28 NOTE — PHYSICAL EXAM
[Appropriately responsive] : appropriately responsive [Alert] : alert [No Acute Distress] : no acute distress [No Lymphadenopathy] : no lymphadenopathy [Soft] : soft [Non-tender] : non-tender [Non-distended] : non-distended [No HSM] : No HSM [No Lesions] : no lesions [No Mass] : no mass [Oriented x3] : oriented x3 [Examination Of The Breasts] : a normal appearance [No Masses] : no breast masses were palpable [Labia Majora] : normal [Labia Minora] : normal [Normal] : normal [Uterine Adnexae] : normal [FreeTextEntry9] : Elías neg

## 2022-10-28 NOTE — DISCUSSION/SUMMARY
[FreeTextEntry1] : Health Maintenance:\par -Pap with HPV neg in past, repeat next year\par -Mammo Rx\par -TBSE\par -colonoscopy guidelines reviewed with patient\par -Achieve Vit D levels of 30-40, intake of 1100 mg daily calcium mostly thru dark green\par leafy greens and milk products, exercise 30 minutes TIW\par \par SEE Attached list of medications, physicians and contacts under MEDICATION LIST

## 2023-02-23 NOTE — PROGRESS NOTE BEHAVIORAL HEALTH - PROBLEM/PLAN-1
DISPLAY PLAN FREE TEXT
Otezla Counseling: The side effects of Otezla were discussed with the patient, including but not limited to worsening or new depression, weight loss, diarrhea, nausea, upper respiratory tract infection, and headache. Patient instructed to call the office should any adverse effect occur.  The patient verbalized understanding of the proper use and possible adverse effects of Otezla.  All the patient's questions and concerns were addressed.

## 2023-11-03 ENCOUNTER — APPOINTMENT (OUTPATIENT)
Dept: OBGYN | Facility: CLINIC | Age: 73
End: 2023-11-03
Payer: MEDICARE

## 2023-11-03 VITALS
DIASTOLIC BLOOD PRESSURE: 58 MMHG | WEIGHT: 196 LBS | BODY MASS INDEX: 38.48 KG/M2 | SYSTOLIC BLOOD PRESSURE: 114 MMHG | HEIGHT: 60 IN

## 2023-11-03 DIAGNOSIS — R92.30 INCONCLUSIVE MAMMOGRAM: ICD-10-CM

## 2023-11-03 DIAGNOSIS — R92.2 INCONCLUSIVE MAMMOGRAM: ICD-10-CM

## 2023-11-03 DIAGNOSIS — J39.2 OTHER DISEASES OF PHARYNX: ICD-10-CM

## 2023-11-03 DIAGNOSIS — Z12.4 ENCOUNTER FOR SCREENING FOR MALIGNANT NEOPLASM OF CERVIX: ICD-10-CM

## 2023-11-03 PROCEDURE — 99214 OFFICE O/P EST MOD 30 MIN: CPT

## 2023-11-03 RX ORDER — NYSTATIN 100000 [USP'U]/G
100000 POWDER TOPICAL TWICE DAILY
Qty: 1 | Refills: 0 | Status: DISCONTINUED | COMMUNITY
Start: 2017-03-02 | End: 2023-11-03

## 2023-11-03 RX ORDER — CLOTRIMAZOLE 10 MG/G
1 CREAM TOPICAL TWICE DAILY
Qty: 1 | Refills: 0 | Status: DISCONTINUED | OUTPATIENT
Start: 2018-04-20 | End: 2023-11-03

## 2023-11-03 RX ORDER — BETAMETHASONE DIPROPIONATE 0.5 MG/G
0.05 CREAM TOPICAL TWICE DAILY
Qty: 1 | Refills: 0 | Status: DISCONTINUED | OUTPATIENT
Start: 2018-04-20 | End: 2023-11-03

## 2023-11-04 PROBLEM — Z12.4 PAP SMEAR FOR CERVICAL CANCER SCREENING: Status: ACTIVE | Noted: 2018-04-20

## 2023-11-04 PROBLEM — J39.2 MASS OF PHARYNX: Status: ACTIVE | Noted: 2018-12-28

## 2023-11-04 PROBLEM — R92.2 DENSE BREASTS: Status: ACTIVE | Noted: 2021-10-22

## 2023-11-07 LAB
CYTOLOGY CVX/VAG DOC THIN PREP: ABNORMAL
HPV HIGH+LOW RISK DNA PNL CVX: NOT DETECTED

## 2023-11-24 ENCOUNTER — NON-APPOINTMENT (OUTPATIENT)
Age: 73
End: 2023-11-24

## 2024-05-16 ENCOUNTER — NON-APPOINTMENT (OUTPATIENT)
Age: 74
End: 2024-05-16

## 2024-05-17 ENCOUNTER — APPOINTMENT (OUTPATIENT)
Dept: OBGYN | Facility: CLINIC | Age: 74
End: 2024-05-17
Payer: MEDICARE

## 2024-05-17 VITALS
SYSTOLIC BLOOD PRESSURE: 132 MMHG | HEIGHT: 60 IN | DIASTOLIC BLOOD PRESSURE: 76 MMHG | BODY MASS INDEX: 37.3 KG/M2 | WEIGHT: 190 LBS

## 2024-05-17 DIAGNOSIS — N76.0 ACUTE VAGINITIS: ICD-10-CM

## 2024-05-17 PROCEDURE — 99213 OFFICE O/P EST LOW 20 MIN: CPT

## 2024-05-17 RX ORDER — NYSTATIN 100000 [USP'U]/G
100000 CREAM TOPICAL
Qty: 1 | Refills: 0 | Status: ACTIVE | COMMUNITY
Start: 2024-05-17 | End: 1900-01-01

## 2024-05-17 NOTE — CHIEF COMPLAINT
[FreeTextEntry1] : 72yo postmenopausal female presents today with complaints of pink tinge s/p inserting applicator for yeast medication on Wednesday. She has not seen and pink discharge since. Reports she used an OTC douche thereafter. She reports she recently started new meds for DM (Last A1C 7.8%). She is not sexually active. Denies any urinary sxs. She denies any discharge currently. Reports mild irritation which she uses Clotrimazole with relief. No other GYN complaints

## 2024-05-17 NOTE — PHYSICAL EXAM
[Alert] : alert [Acute Distress] : no acute distress [Soft] : soft [Tender] : non tender [Vulvar Atrophy] : vulvar atrophy [Labia Majora] : labia major [Labia Minora] : labia minora [Normal] : clitoris [Atrophy] : atrophy [FreeTextEntry4] : left vaginal wall ~ 3mm abrasion, healing, non tender, affirm taken

## 2024-05-17 NOTE — DISCUSSION/SUMMARY
[FreeTextEntry1] : Vaginitis F/u on affirm Importance of proper DM control discussed Rx Nystatin cream

## 2024-05-20 LAB
CANDIDA VAG CYTO: NOT DETECTED
G VAGINALIS+PREV SP MTYP VAG QL MICRO: NOT DETECTED
T VAGINALIS VAG QL WET PREP: NOT DETECTED

## 2024-08-08 ENCOUNTER — NON-APPOINTMENT (OUTPATIENT)
Age: 74
End: 2024-08-08

## 2024-08-09 ENCOUNTER — APPOINTMENT (OUTPATIENT)
Dept: OBGYN | Facility: CLINIC | Age: 74
End: 2024-08-09

## 2024-08-09 PROBLEM — N63.0 LUMP OF BREAST: Status: ACTIVE | Noted: 2024-08-09

## 2024-08-09 PROCEDURE — 99213 OFFICE O/P EST LOW 20 MIN: CPT

## 2024-08-12 NOTE — HISTORY OF PRESENT ILLNESS
[FreeTextEntry1] : 72 yo  with LMP age ~56 and hysterectomy 2012 presents today with complaints of right breast lump during routine breast exam. She reports minimal tenderness which has since resolved. Denies any skin changes nipple changes, nipple discharge   Last breast imaging 2023- BIRADS 2  Menstrual triad: 12 x 28-35 x 7  OB: 1985 TOP  GYN: No current sexual intercourse OCP for regulation of cycles  HPV Menorrhagia - Lupron for myomas  endometrial ablation  myoma, supracervical hysterectomy (ovaries in place) Dr King  Med: DM Depression Asthma  sx: Deviated septum 1970s l/s cholecystectomy  right inner upper breast biopsy  hysterectomy - supracervical  SH: single, living [facility] [Mammogramdate] : 11/2023 [TextBox_19] : BIRADS 2

## 2024-08-12 NOTE — DISCUSSION/SUMMARY
[FreeTextEntry1] : Lump of breast - She will call [facility] now for right breast US and diagnostic mammogram -F/u on report

## 2024-08-12 NOTE — PHYSICAL EXAM
[Appropriately responsive] : appropriately responsive [Alert] : alert [No Acute Distress] : no acute distress [No Lymphadenopathy] : no lymphadenopathy [Examination Of The Breasts] : a normal appearance [Normal] : normal [FreeTextEntry6] : Right breast @ 12oclock ~ 2cm from nipple mobile round nontender mass noted ~ 5mm

## 2024-11-08 ENCOUNTER — APPOINTMENT (OUTPATIENT)
Dept: OBGYN | Facility: CLINIC | Age: 74
End: 2024-11-08
Payer: MEDICARE

## 2024-11-08 VITALS
HEIGHT: 60 IN | BODY MASS INDEX: 34.75 KG/M2 | WEIGHT: 177 LBS | SYSTOLIC BLOOD PRESSURE: 120 MMHG | DIASTOLIC BLOOD PRESSURE: 70 MMHG

## 2024-11-08 DIAGNOSIS — M85.80 OTHER SPECIFIED DISORDERS OF BONE DENSITY AND STRUCTURE, UNSPECIFIED SITE: ICD-10-CM

## 2024-11-08 DIAGNOSIS — R92.30 DENSE BREASTS, UNSPECIFIED: ICD-10-CM

## 2024-11-08 DIAGNOSIS — Z12.39 ENCOUNTER FOR OTHER SCREENING FOR MALIGNANT NEOPLASM OF BREAST: ICD-10-CM

## 2024-11-08 PROCEDURE — 99214 OFFICE O/P EST MOD 30 MIN: CPT

## 2024-11-08 RX ORDER — EMPAGLIFLOZIN, METFORMIN HYDROCHLORIDE 12.5; 1 MG/1; MG/1
12.5-1 TABLET, EXTENDED RELEASE ORAL
Refills: 0 | Status: ACTIVE | COMMUNITY

## 2024-11-08 RX ORDER — FLUTICASONE FUROATE 100 UG/1
100 POWDER RESPIRATORY (INHALATION)
Refills: 0 | Status: ACTIVE | COMMUNITY

## 2024-11-08 RX ORDER — METFORMIN HYDROCHLORIDE 500 MG/1
500 TABLET, COATED ORAL
Qty: 360 | Refills: 1 | Status: ACTIVE | COMMUNITY

## 2024-11-08 RX ORDER — EPINEPHRINE INHALATION 125 UG/1
AEROSOL RESPIRATORY (INHALATION)
Refills: 0 | Status: ACTIVE | COMMUNITY

## 2025-04-08 NOTE — ED ADULT TRIAGE NOTE - MEANS OF ARRIVAL
Lashawn called back and notified No Enterobius vermicularis (pinworm) seen.     Kristin Jarvis RN     stretcher